# Patient Record
Sex: FEMALE | Race: OTHER | Employment: FULL TIME | URBAN - NONMETROPOLITAN AREA
[De-identification: names, ages, dates, MRNs, and addresses within clinical notes are randomized per-mention and may not be internally consistent; named-entity substitution may affect disease eponyms.]

---

## 2018-06-22 ENCOUNTER — HOSPITAL ENCOUNTER (OUTPATIENT)
Dept: WOMENS IMAGING | Age: 58
Discharge: HOME | End: 2018-06-22
Payer: COMMERCIAL

## 2018-06-22 DIAGNOSIS — Z13.9 VISIT FOR SCREENING: ICD-10-CM

## 2018-06-22 PROCEDURE — 77063 BREAST TOMOSYNTHESIS BI: CPT

## 2019-06-28 ENCOUNTER — HOSPITAL ENCOUNTER (OUTPATIENT)
Dept: WOMENS IMAGING | Age: 59
Discharge: HOME | End: 2019-06-28
Payer: COMMERCIAL

## 2019-06-28 DIAGNOSIS — Z13.9 VISIT FOR SCREENING: ICD-10-CM

## 2019-06-28 PROCEDURE — 77063 BREAST TOMOSYNTHESIS BI: CPT

## 2020-07-10 ENCOUNTER — HOSPITAL ENCOUNTER (OUTPATIENT)
Dept: WOMENS IMAGING | Age: 60
Discharge: HOME | End: 2020-07-10
Payer: COMMERCIAL

## 2020-07-10 PROCEDURE — 77063 BREAST TOMOSYNTHESIS BI: CPT

## 2020-11-18 ENCOUNTER — APPOINTMENT (OUTPATIENT)
Dept: CT IMAGING | Age: 60
DRG: 390 | End: 2020-11-18
Payer: COMMERCIAL

## 2020-11-18 ENCOUNTER — APPOINTMENT (OUTPATIENT)
Dept: GENERAL RADIOLOGY | Age: 60
DRG: 390 | End: 2020-11-18
Payer: COMMERCIAL

## 2020-11-18 ENCOUNTER — HOSPITAL ENCOUNTER (INPATIENT)
Age: 60
LOS: 4 days | Discharge: HOME | DRG: 390 | End: 2020-11-22
Attending: EMERGENCY MEDICINE | Admitting: SURGERY
Payer: COMMERCIAL

## 2020-11-18 PROBLEM — K56.609 SMALL BOWEL OBSTRUCTION (HCC): Status: ACTIVE | Noted: 2020-11-18

## 2020-11-18 LAB
ALBUMIN SERPL-MCNC: 4.5 G/DL (ref 3.5–5.1)
ALP BLD-CCNC: 54 U/L (ref 38–126)
ALT SERPL-CCNC: 22 U/L (ref 11–66)
ANION GAP SERPL CALCULATED.3IONS-SCNC: 12 MEQ/L (ref 8–16)
APTT: 30 SECONDS (ref 22–38)
AST SERPL-CCNC: 20 U/L (ref 5–40)
BASOPHILS # BLD: 0.3 %
BASOPHILS ABSOLUTE: 0 THOU/MM3 (ref 0–0.1)
BILIRUB SERPL-MCNC: 0.6 MG/DL (ref 0.3–1.2)
BUN BLDV-MCNC: 10 MG/DL (ref 7–22)
CALCIUM SERPL-MCNC: 9.7 MG/DL (ref 8.5–10.5)
CHLORIDE BLD-SCNC: 96 MEQ/L (ref 98–111)
CO2: 25 MEQ/L (ref 23–33)
CREAT SERPL-MCNC: 0.5 MG/DL (ref 0.4–1.2)
EKG ATRIAL RATE: 79 BPM
EKG P AXIS: 54 DEGREES
EKG P-R INTERVAL: 148 MS
EKG Q-T INTERVAL: 402 MS
EKG QRS DURATION: 80 MS
EKG QTC CALCULATION (BAZETT): 460 MS
EKG R AXIS: 13 DEGREES
EKG T AXIS: 59 DEGREES
EKG VENTRICULAR RATE: 79 BPM
EOSINOPHIL # BLD: 0.1 %
EOSINOPHILS ABSOLUTE: 0 THOU/MM3 (ref 0–0.4)
ERYTHROCYTE [DISTWIDTH] IN BLOOD BY AUTOMATED COUNT: 14.8 % (ref 11.5–14.5)
ERYTHROCYTE [DISTWIDTH] IN BLOOD BY AUTOMATED COUNT: 45.5 FL (ref 35–45)
GFR SERPL CREATININE-BSD FRML MDRD: > 90 ML/MIN/1.73M2
GLUCOSE BLD-MCNC: 133 MG/DL (ref 70–108)
GLUCOSE BLD-MCNC: 175 MG/DL (ref 70–108)
GLUCOSE BLD-MCNC: 79 MG/DL (ref 70–108)
GLUCOSE BLD-MCNC: 96 MG/DL (ref 70–108)
HCT VFR BLD CALC: 44.6 % (ref 37–47)
HEMOGLOBIN: 14.7 GM/DL (ref 12–16)
IMMATURE GRANS (ABS): 0.05 THOU/MM3 (ref 0–0.07)
IMMATURE GRANULOCYTES: 0.3 %
INR BLD: 0.97 (ref 0.85–1.13)
LIPASE: 42.3 U/L (ref 5.6–51.3)
LYMPHOCYTES # BLD: 11.7 %
LYMPHOCYTES ABSOLUTE: 1.7 THOU/MM3 (ref 1–4.8)
MCH RBC QN AUTO: 28.1 PG (ref 26–33)
MCHC RBC AUTO-ENTMCNC: 33 GM/DL (ref 32.2–35.5)
MCV RBC AUTO: 85.3 FL (ref 81–99)
MONOCYTES # BLD: 3.2 %
MONOCYTES ABSOLUTE: 0.5 THOU/MM3 (ref 0.4–1.3)
NUCLEATED RED BLOOD CELLS: 0 /100 WBC
OSMOLALITY CALCULATION: 269.7 MOSMOL/KG (ref 275–300)
PLATELET # BLD: 328 THOU/MM3 (ref 130–400)
PMV BLD AUTO: 10.3 FL (ref 9.4–12.4)
POTASSIUM SERPL-SCNC: 4.2 MEQ/L (ref 3.5–5.2)
RBC # BLD: 5.23 MILL/MM3 (ref 4.2–5.4)
SEG NEUTROPHILS: 84.4 %
SEGMENTED NEUTROPHILS ABSOLUTE COUNT: 12.4 THOU/MM3 (ref 1.8–7.7)
SODIUM BLD-SCNC: 133 MEQ/L (ref 135–145)
TOTAL PROTEIN: 7.6 G/DL (ref 6.1–8)
TROPONIN T: < 0.01 NG/ML
WBC # BLD: 14.7 THOU/MM3 (ref 4.8–10.8)

## 2020-11-18 PROCEDURE — 6360000004 HC RX CONTRAST MEDICATION: Performed by: EMERGENCY MEDICINE

## 2020-11-18 PROCEDURE — 2580000003 HC RX 258: Performed by: EMERGENCY MEDICINE

## 2020-11-18 PROCEDURE — 2500000003 HC RX 250 WO HCPCS: Performed by: PHYSICIAN ASSISTANT

## 2020-11-18 PROCEDURE — APPSS180 APP SPLIT SHARED TIME > 60 MINUTES: Performed by: PHYSICIAN ASSISTANT

## 2020-11-18 PROCEDURE — 85730 THROMBOPLASTIN TIME PARTIAL: CPT

## 2020-11-18 PROCEDURE — 85610 PROTHROMBIN TIME: CPT

## 2020-11-18 PROCEDURE — 6360000002 HC RX W HCPCS: Performed by: EMERGENCY MEDICINE

## 2020-11-18 PROCEDURE — 74018 RADEX ABDOMEN 1 VIEW: CPT

## 2020-11-18 PROCEDURE — 82948 REAGENT STRIP/BLOOD GLUCOSE: CPT

## 2020-11-18 PROCEDURE — 6360000002 HC RX W HCPCS: Performed by: PHYSICIAN ASSISTANT

## 2020-11-18 PROCEDURE — 74177 CT ABD & PELVIS W/CONTRAST: CPT

## 2020-11-18 PROCEDURE — 36415 COLL VENOUS BLD VENIPUNCTURE: CPT

## 2020-11-18 PROCEDURE — 2580000003 HC RX 258: Performed by: PHYSICIAN ASSISTANT

## 2020-11-18 PROCEDURE — 99285 EMERGENCY DEPT VISIT HI MDM: CPT

## 2020-11-18 PROCEDURE — 85025 COMPLETE CBC W/AUTO DIFF WBC: CPT

## 2020-11-18 PROCEDURE — 96374 THER/PROPH/DIAG INJ IV PUSH: CPT

## 2020-11-18 PROCEDURE — 80053 COMPREHEN METABOLIC PANEL: CPT

## 2020-11-18 PROCEDURE — 6360000002 HC RX W HCPCS

## 2020-11-18 PROCEDURE — 93005 ELECTROCARDIOGRAM TRACING: CPT | Performed by: EMERGENCY MEDICINE

## 2020-11-18 PROCEDURE — 84484 ASSAY OF TROPONIN QUANT: CPT

## 2020-11-18 PROCEDURE — 1200000000 HC SEMI PRIVATE

## 2020-11-18 PROCEDURE — 83690 ASSAY OF LIPASE: CPT

## 2020-11-18 PROCEDURE — 96375 TX/PRO/DX INJ NEW DRUG ADDON: CPT

## 2020-11-18 PROCEDURE — 99222 1ST HOSP IP/OBS MODERATE 55: CPT | Performed by: SURGERY

## 2020-11-18 RX ORDER — SODIUM CHLORIDE 0.9 % (FLUSH) 0.9 %
10 SYRINGE (ML) INJECTION PRN
Status: DISCONTINUED | OUTPATIENT
Start: 2020-11-18 | End: 2020-11-22 | Stop reason: HOSPADM

## 2020-11-18 RX ORDER — ONDANSETRON 4 MG/1
4 TABLET, ORALLY DISINTEGRATING ORAL EVERY 8 HOURS PRN
Status: DISCONTINUED | OUTPATIENT
Start: 2020-11-18 | End: 2020-11-22 | Stop reason: HOSPADM

## 2020-11-18 RX ORDER — DEXTROSE MONOHYDRATE 50 MG/ML
100 INJECTION, SOLUTION INTRAVENOUS PRN
Status: DISCONTINUED | OUTPATIENT
Start: 2020-11-18 | End: 2020-11-22 | Stop reason: HOSPADM

## 2020-11-18 RX ORDER — SODIUM CHLORIDE 9 MG/ML
INJECTION, SOLUTION INTRAVENOUS CONTINUOUS
Status: DISCONTINUED | OUTPATIENT
Start: 2020-11-18 | End: 2020-11-18

## 2020-11-18 RX ORDER — MORPHINE SULFATE 4 MG/ML
4 INJECTION, SOLUTION INTRAMUSCULAR; INTRAVENOUS
Status: DISCONTINUED | OUTPATIENT
Start: 2020-11-18 | End: 2020-11-22 | Stop reason: HOSPADM

## 2020-11-18 RX ORDER — SODIUM CHLORIDE 9 MG/ML
INJECTION, SOLUTION INTRAVENOUS CONTINUOUS
Status: DISCONTINUED | OUTPATIENT
Start: 2020-11-18 | End: 2020-11-22

## 2020-11-18 RX ORDER — SODIUM CHLORIDE 0.9 % (FLUSH) 0.9 %
10 SYRINGE (ML) INJECTION EVERY 12 HOURS SCHEDULED
Status: DISCONTINUED | OUTPATIENT
Start: 2020-11-18 | End: 2020-11-22 | Stop reason: HOSPADM

## 2020-11-18 RX ORDER — ONDANSETRON 2 MG/ML
4 INJECTION INTRAMUSCULAR; INTRAVENOUS ONCE
Status: COMPLETED | OUTPATIENT
Start: 2020-11-18 | End: 2020-11-18

## 2020-11-18 RX ORDER — MORPHINE SULFATE 2 MG/ML
INJECTION, SOLUTION INTRAMUSCULAR; INTRAVENOUS
Status: DISCONTINUED
Start: 2020-11-18 | End: 2020-11-18 | Stop reason: WASHOUT

## 2020-11-18 RX ORDER — 0.9 % SODIUM CHLORIDE 0.9 %
1000 INTRAVENOUS SOLUTION INTRAVENOUS ONCE
Status: COMPLETED | OUTPATIENT
Start: 2020-11-18 | End: 2020-11-18

## 2020-11-18 RX ORDER — MORPHINE SULFATE 4 MG/ML
4 INJECTION, SOLUTION INTRAMUSCULAR; INTRAVENOUS ONCE
Status: COMPLETED | OUTPATIENT
Start: 2020-11-18 | End: 2020-11-18

## 2020-11-18 RX ORDER — MORPHINE SULFATE 2 MG/ML
2 INJECTION, SOLUTION INTRAMUSCULAR; INTRAVENOUS
Status: DISCONTINUED | OUTPATIENT
Start: 2020-11-18 | End: 2020-11-22 | Stop reason: HOSPADM

## 2020-11-18 RX ORDER — ONDANSETRON 2 MG/ML
4 INJECTION INTRAMUSCULAR; INTRAVENOUS EVERY 6 HOURS PRN
Status: DISCONTINUED | OUTPATIENT
Start: 2020-11-18 | End: 2020-11-22 | Stop reason: HOSPADM

## 2020-11-18 RX ORDER — DEXTROSE MONOHYDRATE 25 G/50ML
12.5 INJECTION, SOLUTION INTRAVENOUS PRN
Status: DISCONTINUED | OUTPATIENT
Start: 2020-11-18 | End: 2020-11-22 | Stop reason: HOSPADM

## 2020-11-18 RX ORDER — ONDANSETRON 2 MG/ML
INJECTION INTRAMUSCULAR; INTRAVENOUS
Status: DISPENSED
Start: 2020-11-18 | End: 2020-11-18

## 2020-11-18 RX ORDER — NICOTINE POLACRILEX 4 MG
15 LOZENGE BUCCAL PRN
Status: DISCONTINUED | OUTPATIENT
Start: 2020-11-18 | End: 2020-11-22 | Stop reason: HOSPADM

## 2020-11-18 RX ADMIN — SODIUM CHLORIDE: 9 INJECTION, SOLUTION INTRAVENOUS at 22:44

## 2020-11-18 RX ADMIN — FAMOTIDINE 20 MG: 10 INJECTION INTRAVENOUS at 20:24

## 2020-11-18 RX ADMIN — ONDANSETRON 4 MG: 2 INJECTION INTRAMUSCULAR; INTRAVENOUS at 04:24

## 2020-11-18 RX ADMIN — MORPHINE SULFATE 4 MG: 4 INJECTION, SOLUTION INTRAMUSCULAR; INTRAVENOUS at 04:26

## 2020-11-18 RX ADMIN — ONDANSETRON 4 MG: 2 INJECTION INTRAMUSCULAR; INTRAVENOUS at 09:10

## 2020-11-18 RX ADMIN — Medication 10 ML: at 09:10

## 2020-11-18 RX ADMIN — MORPHINE SULFATE 2 MG: 2 INJECTION, SOLUTION INTRAMUSCULAR; INTRAVENOUS at 09:12

## 2020-11-18 RX ADMIN — FAMOTIDINE 20 MG: 10 INJECTION INTRAVENOUS at 09:10

## 2020-11-18 RX ADMIN — IOPAMIDOL 80 ML: 755 INJECTION, SOLUTION INTRAVENOUS at 04:35

## 2020-11-18 RX ADMIN — SODIUM CHLORIDE 1000 ML: 9 INJECTION, SOLUTION INTRAVENOUS at 04:23

## 2020-11-18 RX ADMIN — SODIUM CHLORIDE: 9 INJECTION, SOLUTION INTRAVENOUS at 06:44

## 2020-11-18 RX ADMIN — SODIUM CHLORIDE: 9 INJECTION, SOLUTION INTRAVENOUS at 14:28

## 2020-11-18 ASSESSMENT — ENCOUNTER SYMPTOMS
NAUSEA: 0
CHEST TIGHTNESS: 0
EYE PAIN: 0
EYE ITCHING: 0
STRIDOR: 0
EYE DISCHARGE: 0
ABDOMINAL DISTENTION: 0
SHORTNESS OF BREATH: 0
COUGH: 0
EYE REDNESS: 0
SORE THROAT: 0
VOMITING: 0
BACK PAIN: 0
WHEEZING: 0
PHOTOPHOBIA: 0
DIARRHEA: 0
RHINORRHEA: 0
ABDOMINAL PAIN: 1
CONSTIPATION: 0

## 2020-11-18 ASSESSMENT — PAIN DESCRIPTION - PROGRESSION

## 2020-11-18 ASSESSMENT — PAIN SCALES - GENERAL
PAINLEVEL_OUTOF10: 3
PAINLEVEL_OUTOF10: 6
PAINLEVEL_OUTOF10: 8
PAINLEVEL_OUTOF10: 8
PAINLEVEL_OUTOF10: 4
PAINLEVEL_OUTOF10: 2
PAINLEVEL_OUTOF10: 6

## 2020-11-18 ASSESSMENT — PAIN DESCRIPTION - PAIN TYPE
TYPE: ACUTE PAIN
TYPE: ACUTE PAIN

## 2020-11-18 ASSESSMENT — PAIN DESCRIPTION - FREQUENCY: FREQUENCY: CONTINUOUS

## 2020-11-18 ASSESSMENT — PAIN DESCRIPTION - LOCATION
LOCATION: ABDOMEN
LOCATION: ABDOMEN

## 2020-11-18 ASSESSMENT — PAIN DESCRIPTION - ORIENTATION
ORIENTATION: MID;LEFT;RIGHT
ORIENTATION: MID;RIGHT;LEFT

## 2020-11-18 ASSESSMENT — PAIN - FUNCTIONAL ASSESSMENT: PAIN_FUNCTIONAL_ASSESSMENT: ACTIVITIES ARE NOT PREVENTED

## 2020-11-18 ASSESSMENT — PAIN DESCRIPTION - ONSET: ONSET: ON-GOING

## 2020-11-18 NOTE — H&P
[address] [facility] History & Physical - Karen Varghese PA-C  On behalf of Dr. Gaurav Meredith    Pt Name: Km Stewart  MRN: [id number]  YOB: 1960  Date of evaluation: 11/18/2020  Primary Care Physician: No primary care provider on file. Patient evaluated at the request of  Dr. Patricia Rolle  Reason for evaluation: SBO  IMPRESSIONS   1. Small bowel obstruction  2. Abdominal pain  3. Nausea/vomiting  4.  has a past medical history of Type II or unspecified type diabetes mellitus without mention of complication, not stated as uncontrolled. PLANS   1. NPO  2. IV fluids  3. NG tube LIWS  4. Pain control  5. Antiemetics  6. Repeat labs in morning  7. Sliding scale insulin with accuchecks  8. Attempt conservative management at this time, Dr. Gaurav Meredith to follow up later this morning for further recommendations. SUBJECTIVE   History of Chief Complaint:    Marvin Hutchinson is a 64 y. o.female who presents with abdominal pain. Patient reported she developed acute diffuse abdominal pain yesterday morning that progressed throughout the day. Patient described the pain as \"cramping\". Patient also endorsed multiple episodes of nausea with vomiting. Patient stated he has a past surgical history of exploratory laparotomy for a bowel injury during a gynecological procedure that resulted in a colostomy. Patient endorsed she later had colostomy reversal and abdominal hernia surgery. Patient stated her last bowel movement was yesterday afternoon. She denied any other pain or complaints. She denied any fever/chills, headaches, lightheadedness, dizziness, chest pain, shortness of breath, dysuria, and paresthesias. On exam patient's abdomen was soft with diffuse tenderness to deep palpation. No guarding or rebound tenderness noted. NG tube in place.  CT abdomen/pelvis in ED showed moderate small bowel obstruction with transition point near the surgical anastomosis of the right lower quadrant, possibly due to adhesions and mild diffuse colonic wall thickening which could be due to colitis. Plan for patient to be admitted under general surgery to Dr. Ivone Brewer per patient request. Patient to be NPO, IVF, NG tube, pain control, and antiemetics. Case discussed with general surgeon on call, Dr. Claudetta Grant, and general surgeon, Dr Ivone Brewer. Past Medical History   has a past medical history of Type II or unspecified type diabetes mellitus without mention of complication, not stated as uncontrolled. Past Surgical History   has a past surgical history that includes Abdomen surgery (2005). Medications  Prior to Admission medications    Medication Sig Start Date End Date Taking? Authorizing Provider   metFORMIN (GLUCOPHAGE) 1000 MG tablet TAKE 1 TABLET BY MOUTH TWO TIMES A DAY  6/22/17   Jmoar Gooden MD   glimepiride (AMARYL) 2 MG tablet Take 1/2 tab BID 6/15/16   Jomar Gooden MD   sitaGLIPtin (JANUVIA) 100 MG tablet Take 1 tablet by mouth daily 6/15/16   Jomar Gooden MD   estradiol (ESTRACE) 1 MG tablet Take 1 mg by mouth daily. Historical Provider, MD   Calcium-Vitamin D (CALCIUM + D PO) Take 1 tablet by mouth daily. Historical Provider, MD   Multiple Vitamin (MULTI-VITAMIN PO) Take 1 tablet by mouth daily. Historical Provider, MD    Scheduled Meds:  Continuous Infusions:   sodium chloride       PRN Meds:. Allergies  has No Known Allergies. Family History  family history is not on file. Social History   reports that she has never smoked. She has never used smokeless tobacco. She reports that she does not drink alcohol or use drugs. Review of Systems:  General Denies any fever or chills  HEENT Denies any headaches, lightheadedness, dizziness  Resp Denies any shortness of breath, cough or wheezing  Cardiac Denies any chest pain or palpitations  GI Admits abdominal pain, nausea, and vomiting.  Denies constipation   Denies any dysuria, frequency, or urinary compliants  Heme Denies bruising or bleeding easily  Endocrine Admits history of BUN 10   CREATININE 0.5   CALCIUM 9.7   INR 0.97   AST 20   ALT 22   BILITOT 0.6   LIPASE 42.3     RADIOLOGY     CT ABDOMEN PELVIS W IV CONTRAST Additional Contrast? None   Final Result   Moderate small bowel obstruction with transition point near a surgical    anastomosis in the right lower quadrant, possibly due to adhesions. Mild diffuse colonic wall thickening, which could be due to colitis. Nonemergent/incidental findings in the report. This document has been electronically signed by: Keshav Walters MD on    11/18/2020 05:06 AM      All CT scans at this facility use dose modulation, iterative    reconstruction, and/or weight-based   dosing when appropriate to reduce radiation dose to as low as reasonably    achievable. XR ABDOMEN FOR NG/OG/NE TUBE PLACEMENT    (Results Pending)       Thank you for the interesting evaluation. Further recommendations to follow. Electronically signed by Taniya Sotelo PA-C on 11/18/2020 at 6:31 AM    Patient seen and examined independently by me. The above note has been modified by me to reflect current findings and plans. Labs, cultures, and radiographs where available were reviewed. I discussed patient concerns with the patient's nurse and instructions were given. Please see our orders for the updated patient care plan. SBO either due to adhesions or anastomotic stricture. No emergent surgical indications. NG, follow clinically with labs, films and exams.       Electronically signed by Chapincito Isabel DO on 11/18/2020 at 3:19 PM

## 2020-11-18 NOTE — ED NOTES
Patient resting quietly in cot showing no signs of distress at this time. Rates pain 4/10 but denies need for pain or nausea medication at this time. Updated on POC. Will continue to monitor.       Ludwin Ryan[facility]  11/18/20 8835

## 2020-11-18 NOTE — ED NOTES
ED to inpatient nurses report    Chief Complaint   Patient presents with    Abdominal Pain    Nausea    Emesis      Present to ED from home  LOC: alert and orientated to name, place, date  Vital signs   Vitals:    11/18/20 0327 11/18/20 0428 11/18/20 0612 11/18/20 0631   BP: 136/67 132/76 117/75 (!) 145/93   Pulse: 76 71 71 86   Resp: 18 16 16 20   Temp: 98.2 °F (36.8 °C)      TempSrc: Oral      SpO2: 98% 98% 100% 97%   Weight: 125 lb (56.7 kg)      Height: 4' 11\" (1.499 m)         Oxygen Baseline RA    Current needs required Monitor NG tube output as well as IV fluids infusing. Monitor pt pain. No other needs. Bipap/Cpap No  LDAs:   Peripheral IV 11/18/20 Right Antecubital (Active)   Site Assessment Clean; Intact;Dry 11/18/20 0351   Line Status Brisk blood return;Normal saline locked; Flushed;Specimen collected 11/18/20 0351   Dressing Status Clean;Dry; Intact 11/18/20 0351   Dressing Intervention New 11/18/20 0351     Mobility: Independent  Pending ED orders: None. Present condition: pt resting in cot. Pt pain under control and pt family aware of POC. Pt uncomfortable with NG, but understands need for placement. Pt is NPO. Plan to undergo surgery with Dr. Long Shi per her request. Pt VSS and pt denies needs at this time.    Person of contract , phone number [phone]  Our promise was given to patient    Electronically signed by Merlinda Marchi, RN on 11/18/2020 at [address] A[address]  Dillon Garcia RN  11/18/20 5452

## 2020-11-18 NOTE — ED NOTES
This RN talked with Dr. Naila Rodney in regards to pt tube placement. Physician reviewed xray and informed RN that tube is appropriately placed. Will continue to monitor pt.       Emeterio Morgan RN  11/18/20 0700

## 2020-11-18 NOTE — ED TRIAGE NOTES
Pt presents to the ED with c/o abdominal distention, cramping pain, nausea, and emesis. Pt states that this pain began at 1000 yesterday morning. Pt holding her abdomen and appears to be in pain. Pt reports having a BM yesterday. Pt has distention in her abdomen. Pt breathing easy and unlabored, but in pain with groans. Pt alert and oriented. Pt made comfortable in cot. EKG completed and given to provider. VS obtained. IV inserted and labs obtained and sent to lab. Dr. Mirella Brambila at bedside with pt.

## 2020-11-18 NOTE — ED PROVIDER NOTES
[address] ENCOUNTER      PATIENT NAME: Joss Tejada  MRN: [id number]  : 1960  STODDARD: 2020  PROVIDER: Reva Loomis MD      CHIEF COMPLAINT       Chief Complaint   Patient presents with    Abdominal Pain    Nausea    Emesis       Nurses Notes reviewed and I agreeexcept as noted in the HPI. HISTORY OF PRESENT ILLNESS    Joss Tejada is a 64 y.o. female who presents to Emergency Department with abdominal pain      Onset: Gradual  Location: At home  Severity: Moderate  Timin hours ago  Modifying factors: None  Quality: increasing abdominal pain  Radiation: None  Duration: Persistent  Context: Abdominal pain is gradual onset. Last bowel movement was yesterday afternoon. Pain is moderate now with moderate nausea. Prior abdominal surgeries include exploratory laparotomy for bowel injury during gynecological procedure, patient had colostomy and later colostomy reversal by Dr. Ernesto Ashford. No history of SBO. Prior episodes: None  Therapy today: None  Associated Symptoms: Nausea. Additional history: Hx of type II DM. This HPI was provided by the patient. REVIEW OF SYSTEMS     Review of Systems   Constitutional: Negative for activity change, appetite change, chills, fatigue, fever and unexpected weight change. HENT: Negative for congestion, ear discharge, ear pain, hearing loss, nosebleeds, rhinorrhea and sore throat. Eyes: Negative for photophobia, pain, discharge, redness and itching. Respiratory: Negative for cough, chest tightness, shortness of breath, wheezing and stridor. Cardiovascular: Negative for chest pain, palpitations and leg swelling. Gastrointestinal: Positive for abdominal pain. Negative for abdominal distention, constipation, diarrhea, nausea and vomiting. Endocrine: Negative for cold intolerance, heat intolerance, polydipsia and polyphagia. Genitourinary: Negative for dysuria, flank pain, frequency and hematuria. Musculoskeletal: Negative for arthralgias, back pain, gait problem, myalgias, neck pain and neck stiffness. Skin: Negative for pallor, rash and wound. Allergic/Immunologic: Negative for environmental allergies and food allergies. Neurological: Negative for dizziness, tremors, syncope, weakness and headaches. Psychiatric/Behavioral: Negative for agitation, behavioral problems, confusion, self-injury, sleep disturbance and suicidal ideas. PAST MEDICAL HISTORY     Past Medical History:   Diagnosis Date    Type II or unspecified type diabetes mellitus without mention of complication, not stated as uncontrolled     controlled       SURGICAL HISTORY       Past Surgical History:   Procedure Laterality Date   [place]  2005    Dr. Alvarez Weinberg at [facility] sx X2       CURRENT MEDICATIONS       Previous Medications    CALCIUM-VITAMIN D (CALCIUM + D PO)    Take 1 tablet by mouth daily. ESTRADIOL (ESTRACE) 1 MG TABLET    Take 1 mg by mouth daily. GLIMEPIRIDE (AMARYL) 2 MG TABLET    Take 1/2 tab BID    METFORMIN (GLUCOPHAGE) 1000 MG TABLET    TAKE 1 TABLET BY MOUTH TWO TIMES A DAY     MULTIPLE VITAMIN (MULTI-VITAMIN PO)    Take 1 tablet by mouth daily. SITAGLIPTIN (JANUVIA) 100 MG TABLET    Take 1 tablet by mouth daily       ALLERGIES     Patient has no known allergies. FAMILY HISTORY     has no family status information on file. family history is not on file. SOCIAL HISTORY      reports that she has never smoked. She has never used smokeless tobacco. She reports that she does not drink alcohol or use drugs. PHYSICAL EXAM     INITIAL VITALS:  height is 4' 11\" (1.499 m) and weight is 125 lb (56.7 kg). Her oral temperature is 98.2 °F (36.8 °C). Her blood pressure is 132/76 and her pulse is 71. Her respiration is 16 and oxygen saturation is 98%. Physical Exam  Vitals signs and nursing note reviewed. Constitutional:       Appearance: She is well-developed. She is not diaphoretic. HENT:      Head: Normocephalic and atraumatic. Nose: Nose normal.   Eyes:      General: No scleral icterus. Right eye: No discharge. Left eye: No discharge. Conjunctiva/sclera: Conjunctivae normal.      Pupils: Pupils are equal, round, and reactive to light. Neck:      Musculoskeletal: Normal range of motion and neck supple. Vascular: No JVD. Trachea: No tracheal deviation. Cardiovascular:      Rate and Rhythm: Normal rate and regular rhythm. Heart sounds: Normal heart sounds. No murmur. No friction rub. No gallop. Pulmonary:      Effort: Pulmonary effort is normal. No respiratory distress. Breath sounds: Normal breath sounds. No stridor. No wheezing or rales. Chest:      Chest wall: No tenderness. Abdominal:      General: Bowel sounds are decreased. There is distension. Palpations: Abdomen is soft. There is no mass. Tenderness: There is abdominal tenderness in the periumbilical area. There is no guarding or rebound. Negative signs include Strange's sign, Rovsing's sign, McBurney's sign, psoas sign and obturator sign. Hernia: No hernia is present. Musculoskeletal:         General: No tenderness or deformity. Lymphadenopathy:      Cervical: No cervical adenopathy. Skin:     General: Skin is warm and dry. Capillary Refill: Capillary refill takes less than 2 seconds. Coloration: Skin is not pale. Findings: No erythema or rash. Neurological:      Mental Status: She is alert and oriented to person, place, and time. Cranial Nerves: No cranial nerve deficit. Sensory: No sensory deficit. Motor: No abnormal muscle tone. Coordination: Coordination normal.      Deep Tendon Reflexes: Reflexes normal.   Psychiatric:         Behavior: Behavior normal.         Thought Content:  Thought content normal.         Judgment: Judgment normal.         DIFFERENTIAL DIAGNOSIS:   SBO, ileus, colitis, pancreatitis, gastritis, AMI, thou/mm3    Basophils Absolute 0.0 0.0 - 0.1 thou/mm3    Immature Grans (Abs) 0.05 0.00 - 0.07 thou/mm3    nRBC 0 /100 wbc   Comprehensive Metabolic Panel   Result Value Ref Range    Glucose 175 (H) 70 - 108 mg/dL    CREATININE 0.5 0.4 - 1.2 mg/dL    BUN 10 7 - 22 mg/dL    Sodium 133 (L) 135 - 145 meq/L    Potassium 4.2 3.5 - 5.2 meq/L    Chloride 96 (L) 98 - 111 meq/L    CO2 25 23 - 33 meq/L    Calcium 9.7 8.5 - 10.5 mg/dL    AST 20 5 - 40 U/L    Alkaline Phosphatase 54 38 - 126 U/L    Total Protein 7.6 6.1 - 8.0 g/dL    Alb 4.5 3.5 - 5.1 g/dL    Total Bilirubin 0.6 0.3 - 1.2 mg/dL    ALT 22 11 - 66 U/L   Lipase   Result Value Ref Range    Lipase 42.3 5.6 - 51.3 U/L   APTT   Result Value Ref Range    aPTT 30.0 22.0 - 38.0 seconds   Protime-INR   Result Value Ref Range    INR 0.97 0.85 - 1.13   Troponin   Result Value Ref Range    Troponin T < 0.010 ng/ml   Anion Gap   Result Value Ref Range    Anion Gap 12.0 8.0 - 16.0 meq/L   Glomerular Filtration Rate, Estimated   Result Value Ref Range    Est, Glom Filt Rate >90 ml/min/1.73m2   Osmolality   Result Value Ref Range    Osmolality Calc 269.7 (L) 275.0 - 300.0 mOsmol/kg       EMERGENCY DEPARTMENT COURSE:   Vitals:    Vitals:    11/18/20 0327 11/18/20 0428   BP: 136/67 132/76   Pulse: 76 71   Resp: 18 16   Temp: 98.2 °F (36.8 °C)    TempSrc: Oral    SpO2: 98% 98%   Weight: 125 lb (56.7 kg)    Height: 4' 11\" (1.499 m)      3:46 AM: Patient is seen and evaluated in a timely fashion.      ACTIONS:  Large bore IV  Tele monitor  CT ABDOMEN PELVIS W IV CONTRAST  Labs Reviewed   CBC WITH AUTO DIFFERENTIAL   COMPREHENSIVE METABOLIC PANEL   LIPASE   URINE RT REFLEX TO CULTURE   APTT   PROTIME-INR   TROPONIN     Medications   morphine injection 4 mg (has no administration in time range)   ondansetron (ZOFRAN) injection 4 mg (has no administration in time range)   0.9 % sodium chloride bolus (has no administration in time range)       MEDICAL DECISION MAKINGS:    History and physical exam suspect this is SBO. SBO is confirmed by CT abdomen pelvis, transition at right lower quadrant previous anastomosis site. No surgical abdomen. Pain is better on IV morphine. Treatment in ED includes: NPO, morphine for pain control, IV fluid, and NG tube    Discussed and admitted to general surgery service. CRITICAL CARE:   None    CONSULTS:  Dr. Kush Varela and Henrique Logan PA-C on call for GS    PROCEDURES:  None    FINAL IMPRESSION      1. SBO (small bowel obstruction) ([facility])          DISPOSITION/PLAN   Admit    PATIENT REFERRED TO:  No follow-up provider specified.     DISCHARGE MEDICATIONS:  New Prescriptions    No medications on file       (Please note that portions of this note were completed with a voice recognition program.  Efforts were made to edit the dictations but occasionally words aremis-transcribed.)    MD Pedro Fraga MD  11/18/20 9300

## 2020-11-18 NOTE — ED NOTES
ED nurse-to-nurse bedside report    Chief Complaint   Patient presents with    Abdominal Pain    Nausea    Emesis      LOC: alert and orientated to name, place, date  Vital signs   Vitals:    11/18/20 0428 11/18/20 0612 11/18/20 0631 11/18/20 0654   BP: 132/76 117/75 (!) 145/93 (!) 144/87   Pulse: 71 71 86 87   Resp: 16 16 20 15   Temp:       TempSrc:       SpO2: 98% 100% 97% 98%   Weight:       Height:          Pain:    Pain Interventions: morphine, zofran  Pain Goal: 4/10  Oxygen: No    Current needs required none at this time   Telemetry: Yes  LDAs:   Peripheral IV 11/18/20 Right Antecubital (Active)   Site Assessment Clean; Intact;Dry 11/18/20 0351   Line Status Brisk blood return;Normal saline locked; Flushed;Specimen collected 11/18/20 0351   Dressing Status Clean;Dry; Intact 11/18/20 0351   Dressing Intervention New 11/18/20 0351     Continuous Infusions:    sodium chloride 125 mL/hr at 11/18/20 1016     Mobility: Independent  Tee Fall Risk Score: No flowsheet data found. Fall Interventions: call light in reach. Side rails up x2, brakes on. Pt low fall risk.    Report given to: Brenda Vega RN  11/18/20 [address], RN  11/18/20 3236

## 2020-11-18 NOTE — ED NOTES
NG tube placed by this RN. Pt tolerated insertion well, but reporting feeling it in her throat and being uncomfortable. This RN informed Dr. Rin Peters and he is to discuss tube and verify placement via xray. NG inserted and placed on low intermittent suction. 53cm in right nostril. Will continue to monitor pt. VS updated.       Shailesh Mcleod RN  11/18/20 8907

## 2020-11-19 ENCOUNTER — APPOINTMENT (OUTPATIENT)
Dept: GENERAL RADIOLOGY | Age: 60
DRG: 390 | End: 2020-11-19
Payer: COMMERCIAL

## 2020-11-19 LAB
ANION GAP SERPL CALCULATED.3IONS-SCNC: 12 MEQ/L (ref 8–16)
BUN BLDV-MCNC: 9 MG/DL (ref 7–22)
CALCIUM SERPL-MCNC: 7.8 MG/DL (ref 8.5–10.5)
CHLORIDE BLD-SCNC: 104 MEQ/L (ref 98–111)
CO2: 26 MEQ/L (ref 23–33)
CREAT SERPL-MCNC: 0.3 MG/DL (ref 0.4–1.2)
ERYTHROCYTE [DISTWIDTH] IN BLOOD BY AUTOMATED COUNT: 15.1 % (ref 11.5–14.5)
ERYTHROCYTE [DISTWIDTH] IN BLOOD BY AUTOMATED COUNT: 47.8 FL (ref 35–45)
GFR SERPL CREATININE-BSD FRML MDRD: > 90 ML/MIN/1.73M2
GLUCOSE BLD-MCNC: 108 MG/DL (ref 70–108)
GLUCOSE BLD-MCNC: 109 MG/DL (ref 70–108)
GLUCOSE BLD-MCNC: 140 MG/DL (ref 70–108)
GLUCOSE BLD-MCNC: 86 MG/DL (ref 70–108)
GLUCOSE BLD-MCNC: 97 MG/DL (ref 70–108)
HCT VFR BLD CALC: 39.6 % (ref 37–47)
HEMOGLOBIN: 12.6 GM/DL (ref 12–16)
MCH RBC QN AUTO: 27.8 PG (ref 26–33)
MCHC RBC AUTO-ENTMCNC: 31.8 GM/DL (ref 32.2–35.5)
MCV RBC AUTO: 87.4 FL (ref 81–99)
PLATELET # BLD: 264 THOU/MM3 (ref 130–400)
PMV BLD AUTO: 10.2 FL (ref 9.4–12.4)
POTASSIUM REFLEX MAGNESIUM: 3.7 MEQ/L (ref 3.5–5.2)
RBC # BLD: 4.53 MILL/MM3 (ref 4.2–5.4)
SODIUM BLD-SCNC: 142 MEQ/L (ref 135–145)
WBC # BLD: 10.4 THOU/MM3 (ref 4.8–10.8)

## 2020-11-19 PROCEDURE — 99232 SBSQ HOSP IP/OBS MODERATE 35: CPT | Performed by: SURGERY

## 2020-11-19 PROCEDURE — 1200000000 HC SEMI PRIVATE

## 2020-11-19 PROCEDURE — 80048 BASIC METABOLIC PNL TOTAL CA: CPT

## 2020-11-19 PROCEDURE — 2500000003 HC RX 250 WO HCPCS: Performed by: PHYSICIAN ASSISTANT

## 2020-11-19 PROCEDURE — 85027 COMPLETE CBC AUTOMATED: CPT

## 2020-11-19 PROCEDURE — 6360000002 HC RX W HCPCS: Performed by: PHYSICIAN ASSISTANT

## 2020-11-19 PROCEDURE — 82948 REAGENT STRIP/BLOOD GLUCOSE: CPT

## 2020-11-19 PROCEDURE — 2580000003 HC RX 258: Performed by: PHYSICIAN ASSISTANT

## 2020-11-19 PROCEDURE — 36415 COLL VENOUS BLD VENIPUNCTURE: CPT

## 2020-11-19 PROCEDURE — 74018 RADEX ABDOMEN 1 VIEW: CPT

## 2020-11-19 RX ADMIN — FAMOTIDINE 20 MG: 10 INJECTION INTRAVENOUS at 08:39

## 2020-11-19 RX ADMIN — FAMOTIDINE 20 MG: 10 INJECTION INTRAVENOUS at 20:18

## 2020-11-19 RX ADMIN — SODIUM CHLORIDE: 9 INJECTION, SOLUTION INTRAVENOUS at 22:23

## 2020-11-19 RX ADMIN — MORPHINE SULFATE 2 MG: 2 INJECTION, SOLUTION INTRAMUSCULAR; INTRAVENOUS at 08:47

## 2020-11-19 RX ADMIN — MORPHINE SULFATE 2 MG: 2 INJECTION, SOLUTION INTRAMUSCULAR; INTRAVENOUS at 03:23

## 2020-11-19 RX ADMIN — SODIUM CHLORIDE: 9 INJECTION, SOLUTION INTRAVENOUS at 06:20

## 2020-11-19 RX ADMIN — ONDANSETRON 4 MG: 2 INJECTION INTRAMUSCULAR; INTRAVENOUS at 08:38

## 2020-11-19 ASSESSMENT — PAIN DESCRIPTION - PAIN TYPE
TYPE: ACUTE PAIN

## 2020-11-19 ASSESSMENT — PAIN DESCRIPTION - PROGRESSION: CLINICAL_PROGRESSION: NOT CHANGED

## 2020-11-19 ASSESSMENT — PAIN SCALES - GENERAL
PAINLEVEL_OUTOF10: 4
PAINLEVEL_OUTOF10: 2
PAINLEVEL_OUTOF10: 6
PAINLEVEL_OUTOF10: 2
PAINLEVEL_OUTOF10: 3
PAINLEVEL_OUTOF10: 2
PAINLEVEL_OUTOF10: 2
PAINLEVEL_OUTOF10: 4
PAINLEVEL_OUTOF10: 6
PAINLEVEL_OUTOF10: 2
PAINLEVEL_OUTOF10: 0

## 2020-11-19 ASSESSMENT — PAIN DESCRIPTION - LOCATION
LOCATION: ABDOMEN

## 2020-11-19 ASSESSMENT — PAIN DESCRIPTION - ORIENTATION
ORIENTATION: RIGHT;LEFT;MID
ORIENTATION: RIGHT;LEFT;LOWER;UPPER
ORIENTATION: RIGHT;LEFT;MID

## 2020-11-19 NOTE — PROGRESS NOTES
Carter Estevez, DO [facility]   General Surgery Daily Progress Note    Pt Name: Jose Iglesias  Medical Record Number: [id number]  Date of Birth 1960   Today's Date: 2020  Chief complaint: feeling better  ASSESSMENT   1. Hospital day # 1   2. SBO either due to adhesions or anastomotic stricture   has a past medical history of Type II or unspecified type diabetes mellitus without mention of complication, not stated as uncontrolled. PLAN   1. IV hydration  2. Analgesics and antiemetics as needed  3. NG to LIS  4. AM labs and films  5. Consideration for gastrografin study if fails to improve. 6. Lovenox for DVT prophylaxis  7. Increase activity  Tiffanie Toth is doing slightly better. She denies any nausea or vomiting, has not passed flatus or had a bowel movement. She is tolerating a Diet NPO Effective Now Exceptions are: Ice Chips, Sips with Meds. Her pain is well controlled on current medications. She has been ambulating in the room. CURRENT MEDICATIONS   Scheduled Meds:   [START ON 2020] enoxaparin  40 mg Subcutaneous Daily    sodium chloride flush  10 mL Intravenous 2 times per day    famotidine (PEPCID) injection  20 mg Intravenous BID    insulin lispro  0-6 Units Subcutaneous TID WC    insulin lispro  0-3 Units Subcutaneous Nightly     Continuous Infusions:   sodium chloride 125 mL/hr at 20 0620    dextrose       PRN Meds:.sodium chloride flush, ondansetron **OR** ondansetron, morphine **OR** morphine, glucose, dextrose, dextrose  OBJECTIVE   CURRENT VITALS:  height is 4' 11\" (1.499 m) and weight is 125 lb (56.7 kg). Her oral temperature is 98.6 °F (37 °C). Her blood pressure is 149/62 (abnormal) and her pulse is 74. Her respiration is 16 and oxygen saturation is 99%.    Temperature Range (24h):Temp: 98.6 °F (37 °C) Temp  Av.4 °F (36.9 °C)  Min: 98 °F (36.7 °C)  Max: 98.6 °F (37 °C)  BP Range (62N): Systolic (32TGB), TONY:628 , Min:128 , Max:149 Diastolic (84AAY), WKZ:74, Min:56, Max:67    Pulse Range (24h): Pulse  Av  Min: 68  Max: 74  Respiration Range (24h): Resp  Av.7  Min: 16  Max: 18  Current Pulse Ox (24h):  SpO2: 99 %  Pulse Ox Range (24h):  SpO2  Av.7 %  Min: 97 %  Max: 100 %  Oxygen Amount and Delivery:    Incentive Spirometry Tx:            GENERAL: alert, no distress  LUNGS: clear to ausculation, without wheezes, rales or rhonci  HEART: normal rate and regular rhythm  ABDOMEN: soft, non-tender, non-distended, bowel sounds present but decreased in all 4 quadrants and no guarding or peritoneal signs  EXTREMITY: no cyanosis, clubbing or edema  In: 2162.9 [I.V.:2162.9]  Out: 1325 [Urine:950]  Date 20 0000 - 20 2359   Shift 2887-4647 5462-3985 7558-7645 24 Hour Total   INTAKE   I.V.(mL/kg) 833(14.7) 1329. 9(23.5)  2162. 9(38.1)   Shift Total(mL/kg) 833(14.7) 1329. 9(23.5)  2162. 9(38.1)   OUTPUT   Urine(mL/kg/hr) 300(0.7) 650(1.4)  950   Emesis/NG output(mL/kg) 125(2.2) 250(4.4)  375(6.6)   Shift Total(mL/kg) 425(7.5) 900(15.9)  1325(23.4)   Weight (kg) 56.7 56.7 56.7 56.7     LABS     Recent Labs     20  0350 20  0606   WBC 14.7* 10.4   HGB 14.7 12.6   HCT 44.6 39.6    264   * 142   K 4.2 3.7   CL 96* 104   CO2 25 26   BUN 10 9   CREATININE 0.5 0.3*   CALCIUM 9.7 7.8*      Recent Labs     20  0350   INR 0.97     Recent Labs     20  0350   AST 20   ALT 22   BILITOT 0.6   LIPASE 42.3     Recent Labs     20  0350   TROPONINT < 0.010     RADIOLOGY     XR ABDOMEN (KUB) (SINGLE AP VIEW)   Final Result   Residual partial small bowel obstruction with mottled intraluminal    contents in the right lateral abdomen as previously seen within the    comparison CT 20. This document has been electronically signed by: Alaina Hay on    2020 08:27 AM         XR ABDOMEN FOR NG/OG/NE TUBE PLACEMENT   Final Result   Acceptable nasogastric tube placement.       This document has been electronically signed by: Bryan Herrera. Sasha Morales on    11/18/2020 07:31 AM         CT ABDOMEN PELVIS W IV CONTRAST Additional Contrast? None   Final Result   Moderate small bowel obstruction with transition point near a surgical    anastomosis in the right lower quadrant, possibly due to adhesions. Mild diffuse colonic wall thickening, which could be due to colitis. Nonemergent/incidental findings in the report. This document has been electronically signed by: Johana Franklin MD on    11/18/2020 05:06 AM      All CT scans at this facility use dose modulation, iterative    reconstruction, and/or weight-based   dosing when appropriate to reduce radiation dose to as low as reasonably    achievable.          XR ABDOMEN (KUB) (SINGLE AP VIEW)    (Results Pending)         Electronically signed by Elvie Ordoñez DO on 11/19/2020 at 4:00 PM

## 2020-11-19 NOTE — PROGRESS NOTES
[address] [place] [address]   Daily Progress Note     **This is a Medical/ PA/ APRN Student Note and is charted for educational purposes. The non-physician staff attested note is not to be used for billing purposes or to guide patient care. Please see the physician modifications/ attestation for treatment plan/suggestions. This note has been reviewed and feedback has been provided to the student. *  Pt evaluation and documentation discussed with the student. Please see separate attending note for my independent evaluation and management recommendations. Electronically signed by Zollie Leyden, DO on 11/19/2020 at 4:01 PM  Pt Name: Justin Griffith Record Number: [id number]  Date of Birth 1960   Today's Date: 11/19/2020  ASSESSMENT   1. Hospital day # 1   2. Small bowel obstruction possibly due to adhesions from prior abdominal surgeries  3. Abdominal pain, nausea, vomiting   4. Past medical history of abdominal surgeries, possible hernia   PLAN   1. IV fluids   2. NG tube in place   3. NPO w/ ice chips as tolerated  4. Analgesics and antiemetics   5. Awaiting KUB this morning   6. Possible gastrografin if obstruction is not resolved  7. Continue to monitor labs and vitals  SUBJECTIVE   Yoel Getting has slightly improved than yesterday. Pain is well controlled. She has nausea and vomiting. She has not passed flatus and has not had a bowel movement. She is tolerating a Diet NPO Effective Now Exceptions are: Ice Chips, Sips with Meds diet. She is tolerating ambulating in halls. She states that she is cramping, which has resolved slightly since admission. She denies abdominal distension.    CURRENT MEDICATIONS   Scheduled Meds:   sodium chloride flush  10 mL Intravenous 2 times per day    famotidine (PEPCID) injection  20 mg Intravenous BID    insulin lispro  0-6 Units Subcutaneous TID WC    insulin lispro  0-3 Units Subcutaneous Nightly     Continuous Infusions:   sodium chloride 125 mL/hr at 11/19/20 2908    dextrose       PRN Meds:.sodium chloride flush, ondansetron **OR** ondansetron, morphine **OR** morphine, glucose, dextrose, dextrose  OBJECTIVE   VITALS:  height is 4' 11\" (1.499 m) and weight is 125 lb (56.7 kg). Her oral temperature is 98.4 °F (36.9 °C). Her blood pressure is 131/66 and her pulse is 70. Her respiration is 16 and oxygen saturation is 100%. GENERAL: alert, cooperative, no distress, Comfortable  LUNGS: clear to ausculation, without wheezes, rales or rhonci  HEART: normal rate, regular rhythm, normal S1 and S2, no murmurs, rubs, clicks or gallops, distal pulses intact, no carotid bruits  ABDOMEN: soft, non-tender, non-distended, bowel sounds present in all 4 quadrants and no guarding or peritoneal signs  EXTREMITY: no cyanosis, clubbing or edema  NEURO: oriented x 3, moves all 4 extremities  I/O last 3 completed shifts: In: 2314.3 [I.V.:2314.3]  Out: 2125 [Urine:1700; Emesis/NG output:425]  No intake/output data recorded.   LABS     Recent Labs     11/18/20  0350 11/19/20  0606   WBC 14.7* 10.4   HGB 14.7 12.6   HCT 44.6 39.6    264   * 142   K 4.2 3.7   CL 96* 104   CO2 25 26   BUN 10 9   CREATININE 0.5 0.3*   CALCIUM 9.7 7.8*     Recent Labs     11/18/20  0350   ALKPHOS 54   ALT 22   AST 20   BILITOT 0.6   LABALBU 4.5   LIPASE 42.3     CBC:   Lab Results   Component Value Date    WBC 10.4 11/19/2020    RBC 4.53 11/19/2020    HGB 12.6 11/19/2020    HCT 39.6 11/19/2020    MCV 87.4 11/19/2020    MCH 27.8 11/19/2020    MCHC 31.8 11/19/2020     11/19/2020    MPV 10.2 11/19/2020     RADIOLOGY   CT scan abd/pelvis: See radiology report  Abd flat plate: See radiology report    LADONNA Gamez  Electronically signed 11/19/2020 at 8:14 AM

## 2020-11-19 NOTE — CARE COORDINATION
DISASTER CHARTING    11/19/20, 7:52 AM EST    DISCHARGE ONGOING EVALUATION:     [address] [facility] day: 1  Location: 6E-54/054-A Reason for admit: Small bowel obstruction ([facility].) [K56.609]   Barriers to Discharge: SBO. IVF. NG to LIWS. Pain control. Antiemetics. PCP: No primary care provider on file. Patient Goals/Plan/Treatment Preferences: Plans to return home with family, independently. Denies needs.

## 2020-11-20 ENCOUNTER — APPOINTMENT (OUTPATIENT)
Dept: GENERAL RADIOLOGY | Age: 60
DRG: 390 | End: 2020-11-20
Payer: COMMERCIAL

## 2020-11-20 LAB
ANION GAP SERPL CALCULATED.3IONS-SCNC: 13 MEQ/L (ref 8–16)
BUN BLDV-MCNC: 6 MG/DL (ref 7–22)
CALCIUM SERPL-MCNC: 7.7 MG/DL (ref 8.5–10.5)
CHLORIDE BLD-SCNC: 104 MEQ/L (ref 98–111)
CO2: 23 MEQ/L (ref 23–33)
CREAT SERPL-MCNC: 0.3 MG/DL (ref 0.4–1.2)
ERYTHROCYTE [DISTWIDTH] IN BLOOD BY AUTOMATED COUNT: 14.7 % (ref 11.5–14.5)
ERYTHROCYTE [DISTWIDTH] IN BLOOD BY AUTOMATED COUNT: 46.8 FL (ref 35–45)
GFR SERPL CREATININE-BSD FRML MDRD: > 90 ML/MIN/1.73M2
GLUCOSE BLD-MCNC: 106 MG/DL (ref 70–108)
GLUCOSE BLD-MCNC: 107 MG/DL (ref 70–108)
GLUCOSE BLD-MCNC: 112 MG/DL (ref 70–108)
GLUCOSE BLD-MCNC: 127 MG/DL (ref 70–108)
GLUCOSE BLD-MCNC: 96 MG/DL (ref 70–108)
HCT VFR BLD CALC: 38 % (ref 37–47)
HEMOGLOBIN: 12.6 GM/DL (ref 12–16)
MCH RBC QN AUTO: 28.7 PG (ref 26–33)
MCHC RBC AUTO-ENTMCNC: 33.2 GM/DL (ref 32.2–35.5)
MCV RBC AUTO: 86.6 FL (ref 81–99)
PLATELET # BLD: 253 THOU/MM3 (ref 130–400)
PMV BLD AUTO: 9.8 FL (ref 9.4–12.4)
POTASSIUM SERPL-SCNC: 3.4 MEQ/L (ref 3.5–5.2)
RBC # BLD: 4.39 MILL/MM3 (ref 4.2–5.4)
SODIUM BLD-SCNC: 140 MEQ/L (ref 135–145)
WBC # BLD: 10.8 THOU/MM3 (ref 4.8–10.8)

## 2020-11-20 PROCEDURE — 74018 RADEX ABDOMEN 1 VIEW: CPT

## 2020-11-20 PROCEDURE — 6360000002 HC RX W HCPCS: Performed by: SURGERY

## 2020-11-20 PROCEDURE — 85027 COMPLETE CBC AUTOMATED: CPT

## 2020-11-20 PROCEDURE — 6360000002 HC RX W HCPCS: Performed by: PHYSICIAN ASSISTANT

## 2020-11-20 PROCEDURE — 36415 COLL VENOUS BLD VENIPUNCTURE: CPT

## 2020-11-20 PROCEDURE — 99232 SBSQ HOSP IP/OBS MODERATE 35: CPT | Performed by: SURGERY

## 2020-11-20 PROCEDURE — 2580000003 HC RX 258: Performed by: PHYSICIAN ASSISTANT

## 2020-11-20 PROCEDURE — 82948 REAGENT STRIP/BLOOD GLUCOSE: CPT

## 2020-11-20 PROCEDURE — 71045 X-RAY EXAM CHEST 1 VIEW: CPT

## 2020-11-20 PROCEDURE — 80048 BASIC METABOLIC PNL TOTAL CA: CPT

## 2020-11-20 PROCEDURE — 2500000003 HC RX 250 WO HCPCS: Performed by: PHYSICIAN ASSISTANT

## 2020-11-20 PROCEDURE — 1200000000 HC SEMI PRIVATE

## 2020-11-20 RX ORDER — POTASSIUM CHLORIDE 20 MEQ/1
40 TABLET, EXTENDED RELEASE ORAL ONCE
Status: DISCONTINUED | OUTPATIENT
Start: 2020-11-20 | End: 2020-11-20

## 2020-11-20 RX ORDER — POTASSIUM CHLORIDE 7.45 MG/ML
10 INJECTION INTRAVENOUS
Status: COMPLETED | OUTPATIENT
Start: 2020-11-20 | End: 2020-11-20

## 2020-11-20 RX ADMIN — ONDANSETRON 4 MG: 2 INJECTION INTRAMUSCULAR; INTRAVENOUS at 06:54

## 2020-11-20 RX ADMIN — SODIUM CHLORIDE: 9 INJECTION, SOLUTION INTRAVENOUS at 18:50

## 2020-11-20 RX ADMIN — POTASSIUM CHLORIDE 10 MEQ: 7.46 INJECTION, SOLUTION INTRAVENOUS at 14:58

## 2020-11-20 RX ADMIN — POTASSIUM CHLORIDE 10 MEQ: 7.46 INJECTION, SOLUTION INTRAVENOUS at 12:57

## 2020-11-20 RX ADMIN — FAMOTIDINE 20 MG: 10 INJECTION INTRAVENOUS at 20:02

## 2020-11-20 RX ADMIN — Medication 10 ML: at 09:11

## 2020-11-20 RX ADMIN — ENOXAPARIN SODIUM 40 MG: 40 INJECTION SUBCUTANEOUS at 09:09

## 2020-11-20 RX ADMIN — MORPHINE SULFATE 2 MG: 2 INJECTION, SOLUTION INTRAMUSCULAR; INTRAVENOUS at 06:54

## 2020-11-20 RX ADMIN — SODIUM CHLORIDE: 9 INJECTION, SOLUTION INTRAVENOUS at 07:38

## 2020-11-20 RX ADMIN — POTASSIUM CHLORIDE 10 MEQ: 7.46 INJECTION, SOLUTION INTRAVENOUS at 11:11

## 2020-11-20 RX ADMIN — FAMOTIDINE 20 MG: 10 INJECTION INTRAVENOUS at 09:09

## 2020-11-20 RX ADMIN — Medication 10 ML: at 20:03

## 2020-11-20 RX ADMIN — POTASSIUM CHLORIDE 10 MEQ: 7.46 INJECTION, SOLUTION INTRAVENOUS at 17:10

## 2020-11-20 ASSESSMENT — PAIN SCALES - GENERAL
PAINLEVEL_OUTOF10: 0
PAINLEVEL_OUTOF10: 6
PAINLEVEL_OUTOF10: 3

## 2020-11-20 ASSESSMENT — PAIN DESCRIPTION - DESCRIPTORS: DESCRIPTORS: CRAMPING

## 2020-11-20 ASSESSMENT — PAIN DESCRIPTION - PAIN TYPE: TYPE: ACUTE PAIN

## 2020-11-20 ASSESSMENT — PAIN DESCRIPTION - FREQUENCY: FREQUENCY: CONTINUOUS

## 2020-11-20 ASSESSMENT — PAIN DESCRIPTION - LOCATION: LOCATION: ABDOMEN

## 2020-11-20 NOTE — CARE COORDINATION
DISASTER CHARTING    11/20/20, 1:45 PM EST    DISCHARGE ONGOING EVALUATION:     [address] [facility] day: 2  Location: 6E-54/054-A [facility] for [facility]: [facility] [K56.609]   Barriers to Discharge: Clamping NG, trial clear liquids. PRN pain control and antiemetic. PCP: No primary care provider on file. Patient Goals/Plan/Treatment Preferences: Plans home with  independently. Does not anticipate discharge needs.

## 2020-11-20 NOTE — PROGRESS NOTES
DO [address] [facility] GENER[facility]   General Surgery Daily Progress Note    Pt Name: Stephanie Peter  Medical Record Number: [id number]  Date of Birth 1960   Today's Date: 2020  Chief complaint: feeling better  ASSESSMENT   1. Hospital day # 2   2. SBO either due to adhesions or anastomotic stricture  3. hypokalemia   has a past medical history of Type II or unspecified type diabetes mellitus without mention of complication, not stated as uncontrolled. PLAN   1. IV hydration  2. Analgesics and antiemetics as needed  3. Clamp NG  4. Trial clear liquids  5. Lovenox for DVT prophylaxis  6. Increase activity  Omayra Ng is doing slightly better. She denies any nausea or vomiting, has passed flatus or had a bowel movement. She is tolerating a DIET CLEAR LIQUID;. Her pain is well controlled on current medications. She has been ambulating in the room. CURRENT MEDICATIONS   Scheduled Meds:   potassium (CARDIAC) replacement protocol   Other RX Placeholder    enoxaparin  40 mg Subcutaneous Daily    sodium chloride flush  10 mL Intravenous 2 times per day    famotidine (PEPCID) injection  20 mg Intravenous BID    insulin lispro  0-6 Units Subcutaneous TID WC    insulin lispro  0-3 Units Subcutaneous Nightly     Continuous Infusions:   sodium chloride 125 mL/hr at 20 0738    dextrose       PRN Meds:.sodium chloride flush, ondansetron **OR** ondansetron, morphine **OR** morphine, glucose, dextrose, dextrose  OBJECTIVE   CURRENT VITALS:  height is 4' 11\" (1.499 m) and weight is 125 lb (56.7 kg). Her oral temperature is 98.1 °F (36.7 °C). Her blood pressure is 148/69 (abnormal) and her pulse is 70. Her respiration is 16 and oxygen saturation is 98%.    Temperature Range (24h):Temp: 98.1 °F (36.7 °C) Temp  Av.6 °F (37 °C)  Min: 98.1 °F (36.7 °C)  Max: 99 °F (37.2 °C)  BP Range (09R): Systolic (49HJQ), EBJ:976 , Min:122 , IXP:715     Diastolic (56QZH), XMJ:38, Min:56, Max:69    Pulse Range (24h): Pulse  Av.4  Min: 68  Max: 77  Respiration Range (24h): Resp  Av  Min: 16  Max: 16  Current Pulse Ox (24h):  SpO2: 98 %  Pulse Ox Range (24h):  SpO2  Av.2 %  Min: 97 %  Max: 99 %  Oxygen Amount and Delivery:    Incentive Spirometry Tx:            GENERAL: alert, no distress  LUNGS: clear to ausculation, without wheezes, rales or rhonci  HEART: normal rate and regular rhythm  ABDOMEN: soft, non-tender, non-distended, bowel sounds present but decreased in all 4 quadrants and no guarding or peritoneal signs  EXTREMITY: no cyanosis, clubbing or edema  In: 1508.6 [I.V.:1508.6]  Out: 1250 [Urine:1200]  Date 20 0000 - 20   Shift 6643-0810 3115-0956 1082-6473 24 Hour Total   INTAKE   I.V.(mL/kg) 789. 4(13.9)   789. 4(13.9)   Shift Total(mL/kg) 789. 4(13.9)   789. 4(13.9)   OUTPUT   Urine(mL/kg/hr) 600(1.3)   600   Emesis/NG output(mL/kg) 50(0.9)   50(0.9)   Shift Total(mL/kg) 650(11.5)   650(11.5)   Weight (kg) 56.7 56.7 56.7 56.7     LABS     Recent Labs     20  0350 20  0606 20  0625   WBC 14.7* 10.4 10.8   HGB 14.7 12.6 12.6   HCT 44.6 39.6 38.0    264 253   * 142 140   K 4.2 3.7 3.4*   CL 96* 104 104   CO2 25 26 23   BUN 10 9 6*   CREATININE 0.5 0.3* 0.3*   CALCIUM 9.7 7.8* 7.7*      Recent Labs     20  0350   INR 0.97     Recent Labs     20  0350   AST 20   ALT 22   BILITOT 0.6   LIPASE 42.3     Recent Labs     20  0350   TROPONINT < 0.010     RADIOLOGY     XR CHEST PORTABLE   Final Result   1. The distal tip of the esophageal tube extends into the stomach at the junction of the body and fundus. **This report has been created using voice recognition software. It may contain minor errors which are inherent in voice recognition technology. **      Final report electronically signed by Dr. Chato Marin on 2020 7:39 AM      XR ABDOMEN (KUB) (SINGLE AP VIEW)   Final Result   Impression:   1.  Nonspecific bowel gas pattern. This could represent an ileus. No    definite evidence of bowel obstruction. Follow-up as clinically indicated. This document has been electronically signed by: David Moncada MD on 11/20/2020 06:39 AM         XR ABDOMEN (KUB) (SINGLE AP VIEW)   Final Result   Residual partial small bowel obstruction with mottled intraluminal    contents in the right lateral abdomen as previously seen within the    comparison CT 11/18/20. This document has been electronically signed by: Bryan Morales on    11/19/2020 08:27 AM         XR ABDOMEN FOR NG/OG/NE TUBE PLACEMENT   Final Result   Acceptable nasogastric tube placement. This document has been electronically signed by: Bryan Morales on    11/18/2020 07:31 AM         CT ABDOMEN PELVIS W IV CONTRAST Additional Contrast? None   Final Result   Moderate small bowel obstruction with transition point near a surgical    anastomosis in the right lower quadrant, possibly due to adhesions. Mild diffuse colonic wall thickening, which could be due to colitis. Nonemergent/incidental findings in the report. This document has been electronically signed by: Johana Franklin MD on    11/18/2020 05:06 AM      All CT scans at this facility use dose modulation, iterative    reconstruction, and/or weight-based   dosing when appropriate to reduce radiation dose to as low as reasonably    achievable.                Electronically signed by Elvie Ordoñez DO on 11/20/2020 at 9:06 AM

## 2020-11-20 NOTE — PROGRESS NOTES
[address] [place] [address]   Daily Progress Note     **This is a Medical/ PA/ APRN Student Note and is charted for educational purposes. The non-physician staff attested note is not to be used for billing purposes or to guide patient care. Please see the physician modifications/ attestation for treatment plan/suggestions. This note has been reviewed and feedback has been provided to the student. *  Pt evaluation and documentation discussed with the student. Please see separate attending note for my independent evaluation and management recommendations. Electronically signed by Marianna Vickers DO on 11/20/2020 at 8:16 AM  Pt Name: Joss Tejada  Medical Record Number: [id number]  Date of Birth 1960   Today's Date: 11/20/2020  ASSESSMENT   1. Hospital day # 2   2. Small bowel obstruction possibly due to adhesions from prior abdominal surgeries  3. Abdominal pain, nausea, vomiting   4. Past medical history of abdominal surgeries, possible hernia   PLAN   1. IV fluids   2. NG tube in place   3. NPO w/ ice chips as tolerated  4. Analgesics and antiemetics   5. KUB this morning showed abundant stool in the right colon with gas  6. Possible gastrografin if obstruction is not resolved  7. Continue to monitor labs and vitals  SUBJECTIVE   Aretta Cake has slightly improved than yesterday. Pain is well controlled. She has nausea, but denies vomiting. She has passed flatus and has not had a bowel movement. She is tolerating a Diet NPO Effective Now Exceptions are: Ice Chips, Sips with Meds diet. She is tolerating ambulating in halls.    CURRENT MEDICATIONS   Scheduled Meds:   potassium (CARDIAC) replacement protocol   Other RX Placeholder    enoxaparin  40 mg Subcutaneous Daily    sodium chloride flush  10 mL Intravenous 2 times per day    famotidine (PEPCID) injection  20 mg Intravenous BID    insulin lispro  0-6 Units Subcutaneous TID     insulin lispro  0-3 Units Subcutaneous Nightly     Continuous Infusions:  

## 2020-11-21 LAB
ANION GAP SERPL CALCULATED.3IONS-SCNC: 15 MEQ/L (ref 8–16)
BUN BLDV-MCNC: 8 MG/DL (ref 7–22)
CALCIUM SERPL-MCNC: 7.5 MG/DL (ref 8.5–10.5)
CHLORIDE BLD-SCNC: 102 MEQ/L (ref 98–111)
CO2: 21 MEQ/L (ref 23–33)
CREAT SERPL-MCNC: 0.3 MG/DL (ref 0.4–1.2)
GFR SERPL CREATININE-BSD FRML MDRD: > 90 ML/MIN/1.73M2
GLUCOSE BLD-MCNC: 103 MG/DL (ref 70–108)
GLUCOSE BLD-MCNC: 107 MG/DL (ref 70–108)
GLUCOSE BLD-MCNC: 119 MG/DL (ref 70–108)
GLUCOSE BLD-MCNC: 125 MG/DL (ref 70–108)
GLUCOSE BLD-MCNC: 98 MG/DL (ref 70–108)
POTASSIUM SERPL-SCNC: 3.8 MEQ/L (ref 3.5–5.2)
SODIUM BLD-SCNC: 138 MEQ/L (ref 135–145)

## 2020-11-21 PROCEDURE — 6370000000 HC RX 637 (ALT 250 FOR IP): Performed by: SURGERY

## 2020-11-21 PROCEDURE — 82948 REAGENT STRIP/BLOOD GLUCOSE: CPT

## 2020-11-21 PROCEDURE — 99232 SBSQ HOSP IP/OBS MODERATE 35: CPT | Performed by: SURGERY

## 2020-11-21 PROCEDURE — 2500000003 HC RX 250 WO HCPCS: Performed by: PHYSICIAN ASSISTANT

## 2020-11-21 PROCEDURE — 1200000000 HC SEMI PRIVATE

## 2020-11-21 PROCEDURE — 36415 COLL VENOUS BLD VENIPUNCTURE: CPT

## 2020-11-21 PROCEDURE — 6360000002 HC RX W HCPCS: Performed by: PHYSICIAN ASSISTANT

## 2020-11-21 PROCEDURE — 6360000002 HC RX W HCPCS: Performed by: SURGERY

## 2020-11-21 PROCEDURE — 80048 BASIC METABOLIC PNL TOTAL CA: CPT

## 2020-11-21 PROCEDURE — 2580000003 HC RX 258: Performed by: PHYSICIAN ASSISTANT

## 2020-11-21 RX ORDER — IBUPROFEN 200 MG
CAPSULE ORAL 2 TIMES DAILY
Status: DISCONTINUED | OUTPATIENT
Start: 2020-11-21 | End: 2020-11-22 | Stop reason: HOSPADM

## 2020-11-21 RX ADMIN — MORPHINE SULFATE 2 MG: 2 INJECTION, SOLUTION INTRAMUSCULAR; INTRAVENOUS at 12:07

## 2020-11-21 RX ADMIN — SODIUM CHLORIDE: 9 INJECTION, SOLUTION INTRAVENOUS at 02:54

## 2020-11-21 RX ADMIN — MORPHINE SULFATE 2 MG: 2 INJECTION, SOLUTION INTRAMUSCULAR; INTRAVENOUS at 03:07

## 2020-11-21 RX ADMIN — FAMOTIDINE 20 MG: 10 INJECTION INTRAVENOUS at 19:25

## 2020-11-21 RX ADMIN — MORPHINE SULFATE 2 MG: 2 INJECTION, SOLUTION INTRAMUSCULAR; INTRAVENOUS at 14:41

## 2020-11-21 RX ADMIN — SODIUM CHLORIDE: 9 INJECTION, SOLUTION INTRAVENOUS at 18:36

## 2020-11-21 RX ADMIN — Medication 10 ML: at 09:20

## 2020-11-21 RX ADMIN — ONDANSETRON 4 MG: 2 INJECTION INTRAMUSCULAR; INTRAVENOUS at 12:06

## 2020-11-21 RX ADMIN — BACITRACIN ZINC NEOMYCIN SULFATE POLYMYXIN B SULFATE: 400; 3.5; 5 OINTMENT TOPICAL at 20:44

## 2020-11-21 RX ADMIN — FAMOTIDINE 20 MG: 10 INJECTION INTRAVENOUS at 09:20

## 2020-11-21 RX ADMIN — ONDANSETRON 4 MG: 2 INJECTION INTRAMUSCULAR; INTRAVENOUS at 03:07

## 2020-11-21 RX ADMIN — SODIUM CHLORIDE: 9 INJECTION, SOLUTION INTRAVENOUS at 11:09

## 2020-11-21 RX ADMIN — Medication 10 ML: at 19:25

## 2020-11-21 RX ADMIN — ENOXAPARIN SODIUM 40 MG: 40 INJECTION SUBCUTANEOUS at 09:21

## 2020-11-21 ASSESSMENT — PAIN DESCRIPTION - LOCATION
LOCATION: ABDOMEN
LOCATION: ABDOMEN

## 2020-11-21 ASSESSMENT — PAIN SCALES - GENERAL
PAINLEVEL_OUTOF10: 6
PAINLEVEL_OUTOF10: 0

## 2020-11-21 ASSESSMENT — PAIN DESCRIPTION - PAIN TYPE
TYPE: ACUTE PAIN
TYPE: ACUTE PAIN

## 2020-11-21 ASSESSMENT — PAIN DESCRIPTION - DESCRIPTORS
DESCRIPTORS: CRAMPING
DESCRIPTORS: CRAMPING

## 2020-11-21 ASSESSMENT — PAIN DESCRIPTION - ORIENTATION
ORIENTATION: MID;LOWER
ORIENTATION: RIGHT

## 2020-11-21 ASSESSMENT — PAIN DESCRIPTION - FREQUENCY: FREQUENCY: INTERMITTENT

## 2020-11-21 NOTE — PROGRESS NOTES
Zollie Leyden, DO [facility]   General Surgery Daily Progress Note    Pt Name: Miguel Bartlett  Medical Record Number: [id number]  Date of Birth 1960   Today's Date: 2020  Chief complaint: feeling better  ASSESSMENT   1. Hospital day # 3   2. SBO either due to adhesions or anastomotic stricture  3. hypokalemia   has a past medical history of Type II or unspecified type diabetes mellitus without mention of complication, not stated as uncontrolled. PLAN   1. IV hydration  2. Analgesics and antiemetics as needed  3. Remove NG  4. full liquids  5. Lovenox for DVT prophylaxis  6. Increase activity  Shimon Harp is doing better. She denies any nausea or vomiting, has passed flatus or had a bowel movement. She is tolerating a DIET FULL LIQUID;. Her pain is well controlled on current medications. She has been ambulating in the room. CURRENT MEDICATIONS   Scheduled Meds:   potassium (CARDIAC) replacement protocol   Other RX Placeholder    enoxaparin  40 mg Subcutaneous Daily    sodium chloride flush  10 mL Intravenous 2 times per day    famotidine (PEPCID) injection  20 mg Intravenous BID    insulin lispro  0-6 Units Subcutaneous TID WC    insulin lispro  0-3 Units Subcutaneous Nightly     Continuous Infusions:   sodium chloride 125 mL/hr at 20 1109    dextrose       PRN Meds:.sodium chloride flush, ondansetron **OR** ondansetron, morphine **OR** morphine, glucose, dextrose, dextrose  OBJECTIVE   CURRENT VITALS:  height is 4' 11\" (1.499 m) and weight is 125 lb (56.7 kg). Her oral temperature is 98.6 °F (37 °C). Her blood pressure is 136/70 and her pulse is 68. Her respiration is 18 and oxygen saturation is 99%.    Temperature Range (24h):Temp: 98.6 °F (37 °C) Temp  Av.7 °F (37.1 °C)  Min: 97.9 °F (36.6 °C)  Max: 99.3 °F (37.4 °C)  BP Range (37L): Systolic (09BFT), LLY:613 , Min:135 , SIB:292     Diastolic (10ZJP), ITR:83, Min:66, Max:75    Pulse Range (24h): Pulse  Avg: 72.2  Min: 67  Max: 78  Respiration Range (24h): Resp  Av.7  Min: 16  Max: 18  Current Pulse Ox (24h):  SpO2: 99 %  Pulse Ox Range (24h):  SpO2  Av %  Min: 33 %  Max: 100 %  Oxygen Amount and Delivery:    Incentive Spirometry Tx:            GENERAL: alert, no distress  LUNGS: clear to ausculation, without wheezes, rales or rhonci  HEART: normal rate and regular rhythm  ABDOMEN: soft, non-tender, non-distended, bowel sounds present but decreased in all 4 quadrants and no guarding or peritoneal signs  EXTREMITY: no cyanosis, clubbing or edema  In: 2309.3 [P.O.:540; I.V.:1669.3]  Out: 3125 [Urine:3125]  Date 20 0000 - 20 2359   Shift 2485-2107 2960-6952 6916-5594 24 Hour Total   INTAKE   P.O.(mL/kg/hr) 0(0) 240  240   I. V.(mL/kg) 945. 2(16.7)   945. 2(16.7)   Shift Total(mL/kg) 945. 2(16.7) 240(4.2)  1185. 2(20.9)   OUTPUT   Urine(mL/kg/hr) 1625(3.6)   1625   Shift Total(mL/kg) 2893(46.9)   6708(38.2)   Weight (kg) 56.7 56.7 56.7 56.7     LABS     Recent Labs     20  0606 20  0625 20  0910   WBC 10.4 10.8  --    HGB 12.6 12.6  --    HCT 39.6 38.0  --     253  --     140 138   K 3.7 3.4* 3.8    104 102   CO2 26 23 21*   BUN 9 6* 8   CREATININE 0.3* 0.3* 0.3*   CALCIUM 7.8* 7.7* 7.5*      No results for input(s): PTT, INR in the last 72 hours. Invalid input(s): PT  No results for input(s): AST, ALT, BILITOT, BILIDIR, AMYLASE, LIPASE, LDH, LACTA in the last 72 hours. No results for input(s): TROPONINT in the last 72 hours. RADIOLOGY     XR CHEST PORTABLE   Final Result   1. The distal tip of the esophageal tube extends into the stomach at the junction of the body and fundus. **This report has been created using voice recognition software. It may contain minor errors which are inherent in voice recognition technology. **      Final report electronically signed by Dr. Elkin Patterson on 2020 7:39 AM      XR ABDOMEN (KUB) (SINGLE AP VIEW)   Final Result

## 2020-11-22 VITALS
DIASTOLIC BLOOD PRESSURE: 70 MMHG | RESPIRATION RATE: 16 BRPM | HEIGHT: 59 IN | OXYGEN SATURATION: 99 % | BODY MASS INDEX: 25.2 KG/M2 | WEIGHT: 125 LBS | TEMPERATURE: 98.5 F | SYSTOLIC BLOOD PRESSURE: 145 MMHG | HEART RATE: 57 BPM

## 2020-11-22 LAB
ANION GAP SERPL CALCULATED.3IONS-SCNC: 11 MEQ/L (ref 8–16)
BUN BLDV-MCNC: 7 MG/DL (ref 7–22)
CALCIUM SERPL-MCNC: 7.6 MG/DL (ref 8.5–10.5)
CHLORIDE BLD-SCNC: 102 MEQ/L (ref 98–111)
CO2: 24 MEQ/L (ref 23–33)
CREAT SERPL-MCNC: 0.4 MG/DL (ref 0.4–1.2)
GFR SERPL CREATININE-BSD FRML MDRD: > 90 ML/MIN/1.73M2
GLUCOSE BLD-MCNC: 111 MG/DL (ref 70–108)
GLUCOSE BLD-MCNC: 88 MG/DL (ref 70–108)
GLUCOSE BLD-MCNC: 96 MG/DL (ref 70–108)
POTASSIUM SERPL-SCNC: 3.4 MEQ/L (ref 3.5–5.2)
SODIUM BLD-SCNC: 137 MEQ/L (ref 135–145)

## 2020-11-22 PROCEDURE — 99232 SBSQ HOSP IP/OBS MODERATE 35: CPT | Performed by: SURGERY

## 2020-11-22 PROCEDURE — 6360000002 HC RX W HCPCS: Performed by: SURGERY

## 2020-11-22 PROCEDURE — 80048 BASIC METABOLIC PNL TOTAL CA: CPT

## 2020-11-22 PROCEDURE — 2580000003 HC RX 258: Performed by: PHYSICIAN ASSISTANT

## 2020-11-22 PROCEDURE — 36415 COLL VENOUS BLD VENIPUNCTURE: CPT

## 2020-11-22 PROCEDURE — 2500000003 HC RX 250 WO HCPCS: Performed by: PHYSICIAN ASSISTANT

## 2020-11-22 PROCEDURE — 82948 REAGENT STRIP/BLOOD GLUCOSE: CPT

## 2020-11-22 RX ADMIN — ENOXAPARIN SODIUM 40 MG: 40 INJECTION SUBCUTANEOUS at 08:43

## 2020-11-22 RX ADMIN — SODIUM CHLORIDE: 9 INJECTION, SOLUTION INTRAVENOUS at 02:40

## 2020-11-22 RX ADMIN — FAMOTIDINE 20 MG: 10 INJECTION INTRAVENOUS at 08:42

## 2020-11-22 RX ADMIN — Medication 10 ML: at 08:42

## 2020-11-22 ASSESSMENT — PAIN SCALES - GENERAL: PAINLEVEL_OUTOF10: 0

## 2020-11-22 NOTE — PROGRESS NOTES
Zollie Leyden, DO [facility]   General Surgery Daily Progress Note    Pt Name: Miguel Bartlett  Medical Record Number: [id number]  Date of Birth 1960   Today's Date: 2020  Chief complaint: feeling better  ASSESSMENT   1. Hospital day # 4   2. SBO either due to adhesions or anastomotic stricture  3. Hypokalemia - corrected   has a past medical history of Type II or unspecified type diabetes mellitus without mention of complication, not stated as uncontrolled. PLAN   1. Low residue diet as tolerated  2. If tolerated, then discharge home this afternoon. 3. F/U in office in 2 weeks. Shimon Harp is doing better. She denies any nausea or vomiting, has passed flatus and had a bowel movement. She is tolerating a DIET LOW FIBER;. Her pain is well controlled on current medications. She has been ambulating in the room. CURRENT MEDICATIONS   Scheduled Meds:   neomycin-bacitracin-polymyxin   Topical BID    potassium (CARDIAC) replacement protocol   Other RX Placeholder    enoxaparin  40 mg Subcutaneous Daily    sodium chloride flush  10 mL Intravenous 2 times per day    famotidine (PEPCID) injection  20 mg Intravenous BID    insulin lispro  0-6 Units Subcutaneous TID WC    insulin lispro  0-3 Units Subcutaneous Nightly     Continuous Infusions:   dextrose       PRN Meds:.sodium chloride flush, ondansetron **OR** ondansetron, morphine **OR** morphine, glucose, dextrose, dextrose  OBJECTIVE   CURRENT VITALS:  height is 4' 11\" (1.499 m) and weight is 125 lb (56.7 kg). Her oral temperature is 98.3 °F (36.8 °C). Her blood pressure is 138/66 and her pulse is 60. Her respiration is 16 and oxygen saturation is 97%.    Temperature Range (24h):Temp: 98.3 °F (36.8 °C) Temp  Av.6 °F (37 °C)  Min: 98.2 °F (36.8 °C)  Max: 99.5 °F (37.5 °C)  BP Range (27Y): Systolic (16NZR), GRJ:118 , Min:117 , YOM:379     Diastolic (11IPH), SIMONA:16, Min:64, Max:68    Pulse Range (24h): Pulse  Av  Min: 60  Max: 68  Respiration Range (24h): Resp  Av.8  Min: 16  Max: 20  Current Pulse Ox (24h):  SpO2: 97 %  Pulse Ox Range (24h):  SpO2  Av.5 %  Min: 97 %  Max: 100 %  Oxygen Amount and Delivery:    Incentive Spirometry Tx:            GENERAL: alert, no distress  LUNGS: clear to ausculation, without wheezes, rales or rhonci  HEART: normal rate and regular rhythm  ABDOMEN: soft, non-tender, non-distended, bowel sounds present but decreased in all 4 quadrants and no guarding or peritoneal signs  EXTREMITY: no cyanosis, clubbing or edema  In: 2229.5 [P.O.:560; I.V.:1669.5]  Out: 2200 [Urine:2200]  Date 20 - 20   Shift 0820-4728 8952-9558 9903-6615 24 Hour Total   INTAKE   P.O.(mL/kg/hr) 120(0.3)   120   I. V.(mL/kg) 910.6(16.1)   910.6(16.1)   Shift Total(mL/kg) 1030. 6(18.2)   1030. 6(18.2)   OUTPUT   Urine(mL/kg/hr) 900(2)   900   Shift Total(mL/kg) 900(15.9)   900(15.9)   Weight (kg) 56.7 56.7 56.7 56.7     LABS     Recent Labs     20  0625 20  0910   WBC 10.8  --    HGB 12.6  --    HCT 38.0  --      --     138   K 3.4* 3.8    102   CO2 23 21*   BUN 6* 8   CREATININE 0.3* 0.3*   CALCIUM 7.7* 7.5*      No results for input(s): PTT, INR in the last 72 hours. Invalid input(s): PT  No results for input(s): AST, ALT, BILITOT, BILIDIR, AMYLASE, LIPASE, LDH, LACTA in the last 72 hours. No results for input(s): TROPONINT in the last 72 hours. RADIOLOGY     XR CHEST PORTABLE   Final Result   1. The distal tip of the esophageal tube extends into the stomach at the junction of the body and fundus. **This report has been created using voice recognition software. It may contain minor errors which are inherent in voice recognition technology. **      Final report electronically signed by Dr. Shilpa Gonsales on 2020 7:39 AM      XR ABDOMEN (KUB) (SINGLE AP VIEW)   Final Result   Impression:   1. Nonspecific bowel gas pattern. This could represent an ileus.  No definite evidence of bowel obstruction. Follow-up as clinically indicated. This document has been electronically signed by: Leonard Horan MD on 11/20/2020 06:39 AM         XR ABDOMEN (KUB) (SINGLE AP VIEW)   Final Result   Residual partial small bowel obstruction with mottled intraluminal    contents in the right lateral abdomen as previously seen within the    comparison CT 11/18/20. This document has been electronically signed by: Juan M Youssef. Tahir Sil on    11/19/2020 08:27 AM         XR ABDOMEN FOR NG/OG/NE TUBE PLACEMENT   Final Result   Acceptable nasogastric tube placement. This document has been electronically signed by: Juan M Youssef. Tahir Sil on    11/18/2020 07:31 AM         CT ABDOMEN PELVIS W IV CONTRAST Additional Contrast? None   Final Result   Moderate small bowel obstruction with transition point near a surgical    anastomosis in the right lower quadrant, possibly due to adhesions. Mild diffuse colonic wall thickening, which could be due to colitis. Nonemergent/incidental findings in the report. This document has been electronically signed by: Yoon Topete MD on    11/18/2020 05:06 AM      All CT scans at this facility use dose modulation, iterative    reconstruction, and/or weight-based   dosing when appropriate to reduce radiation dose to as low as reasonably    achievable.                Electronically signed by Sonja Rodarte DO on 11/22/2020 at 9:42 AM

## 2020-11-25 NOTE — ADT AUTH CERT
Clamp NG    * Milestone    Additional Notes    11/20        Vs- 98.2 (36.8)   20   71   148/69  98% RA       Labs    11/20/2020 06:25    Sodium: 140    Potassium: 3.4 (L)    Chloride: 104    CO2: 23    BUN: 6 (L)    Creatinine: 0.3 (L)    Anion Gap: 13.0    Est, Glom Filt Rate: >90    Glucose: 127 (H)    Calcium: 7.7 (L)    WBC: 10.8    RBC: 4.39    Hemoglobin Quant: 12.6    Hematocrit: 38.0    MCV: 86.6    MCH: 28.7    MCHC: 33.2    MPV: 9.8    RDW-CV: 14.7 (H)    RDW-SD: 46.8 (H)    Platelet Count: 767       11/20/2020 06:31    XR ABDOMEN    Impression:    1. Nonspecific bowel gas pattern. This could represent an ileus. No    definite evidence of bowel obstruction. Follow-up as clinically indicated. 11/20/2020 07:35    XR CHEST    1. The distal tip of the esophageal tube extends into the stomach at the junction of the body and fundus. Per GS    ASSESSMENT    1. Hospital day # 2    2. SBO either due to adhesions or anastomotic stricture    3. hypokalemia    4. has a past medical history of Type II or unspecified type diabetes mellitus without mention of complication, not stated as uncontrolled. PLAN    1. IV hydration    2. Analgesics and antiemetics as needed    3. Clamp NG    4. Trial clear liquids    5. Lovenox for DVT prophylaxis    6. Increase activity       Whitehouse Cheryl is doing slightly better. She denies any nausea or vomiting, has passed flatus or had a bowel movement. She is tolerating a DIET CLEAR LIQUID;. Her pain is well controlled on current medications. She has been ambulating in the room.           Scheduled Medications    · enoxaparin 40 mg Subcutaneous Daily    · famotidine (PEPCID) injection 20 mg Intravenous BID    · insulin lispro 0-6 Units Subcutaneous TID WC    · insulin lispro 0-3 Units Subcutaneous Nightly       Infusions Meds    · sodium chloride 125 mL/hr          PRN Meds    morphine (PF) injection 2 mg  x1    ondansetron (ZOFRAN) injection 4 mg x1

## 2020-12-10 NOTE — PROGRESS NOTES
Nely Atkinson. [facility], [address] [place] [address] 360  [place] [address]  [phone]  Hospital Follow-up Evaluation in Office    Pt Name: Estelle Egan  Date of Birth 1960   Today's Date: 2020  Medical Record Number: [id number]  Referring Provider: No ref. provider found  Primary Care Provider: No primary care provider on file. Chief Complaint   Patient presents with    Follow-Up from [facility] on 2020-Small bowel obstruction     ASSESSMENT       Diagnosis Orders   1. Small bowel obstruction ([address]           PLANS       Patient Instructions   Low residue diet as tolerated, may include salads and grains. Continue to avoid large bulky raw vegetables, large seeds or nuts. No orders of the defined types were placed in this encounter. Follow up: Return for As needed. Instructed to call if any concerns. Brianne Casas is a 64 y.o. female seen in the office as a hospital follow up. Pt was admitted to the hospital for a small bowel obstruction. This was managed medically. Since discharge, Her symptoms have improved. She is tolerating a regular diet, having regular bowel movements. She denies any abdominal pain, change in bowel habits, constipation, nausea and vomiting. Pain is controlled without any medications.   Past Medical History  Past Medical History:   Diagnosis Date    Anemia     Small bowel obstruction (HCC)     Type II or unspecified type diabetes mellitus without mention of complication, not stated as uncontrolled     controlled     Past Surgical History  Past Surgical History:   Procedure Laterality Date     SECTION      x2    HYSTERECTOMY  2005    Dr Jeniffer Alvarado    OTHER SURGICAL HISTORY  2005    exp lap placment colostomy- [facility]    OTHER SURGICAL HISTORY  2005    Takedown of colostomy small bowel resection Dr Omar Plaza  2006    Dr Karin Gordon Medications  Current Outpatient Medications   Medication Sig Dispense Refill    insulin glargine (LANTUS) 100 UNIT/ML injection vial Inject into the skin nightly 15-30 units morning      VITAMIN D PO Take by mouth daily      ferrous sulfate (IRON 325) 325 (65 Fe) MG tablet Take 325 mg by mouth daily (with breakfast)      vitamin B-12 (CYANOCOBALAMIN) 100 MCG tablet Take 50 mcg by mouth daily      metFORMIN (GLUCOPHAGE) 1000 MG tablet TAKE 1 TABLET BY MOUTH TWO TIMES A DAY  60 tablet 2    sitaGLIPtin (JANUVIA) 100 MG tablet Take 1 tablet by mouth daily 90 tablet 3    estradiol (ESTRACE) 1 MG tablet Take 1 mg by mouth daily.  Calcium-Vitamin D (CALCIUM + D PO) Take 1 tablet by mouth daily.  Multiple Vitamin (MULTI-VITAMIN PO) Take 1 tablet by mouth daily. No current facility-administered medications for this visit. Allergies  has No Known Allergies. Family History  family history is not on file. Social History   reports that she has never smoked. She has never used smokeless tobacco. She reports that she does not drink alcohol or use drugs. Health Screening Exams  Health Maintenance   Topic Date Due    Hepatitis C screen  1960    Diabetic foot exam  07/21/1972    HIV screen  07/21/1977    Hepatitis B vaccine (1 of 3 - Risk 3-dose series) 07/21/1981    DTaP/Tdap/Td vaccine (1 - Tdap) 07/21/1981    Cervical cancer screen  07/21/1983    Shingles Vaccine (1 of 2) 07/21/2012    Lipid screen  09/12/2015    Diabetic microalbuminuria test  12/12/2016    A1C test (Diabetic or Prediabetic)  11/05/2017    Diabetic retinal exam  01/17/2018    Flu vaccine (1) 09/01/2020    Breast cancer screen  07/10/2022    Colon cancer screen colonoscopy  04/26/2023    Hepatitis A vaccine  Aged Out    Hib vaccine  Aged Out    Meningococcal (ACWY) vaccine  Aged Out    Pneumococcal 0-64 years Vaccine  Aged Out     Review of Systems  History obtained from the patient.   Constitutional: Denies any fever, chills. Derm: Denies any rash or skin color change. Eyes: Denies blurred or decreased in vision. Ent: Denies any tinnitus or vertigo. Resp: Denies any cough or shortness of breath. CV: Denies any syncope, palpitations or chest pain. GI:  Denies any abdominal pain, nausea, vomiting, constipation or diarrhea. : Denies any hematuria, hesitancy, or dysuria. Heme/Lymph: Denies any bleeding. Musculoskeletal: Denies any myalgias, muscle weakness or neck pain. Neuro: Denies any dizziness, paresthesia or weakness. OBJECTIVE    VITALS:  height is 4' 11\" (1.499 m) and weight is 123 lb 11.2 oz (56.1 kg). Her temporal temperature is 97 °F (36.1 °C). Her blood pressure is 120/60 and her pulse is 83. Her respiration is 18 and oxygen saturation is 98%. Pain Score:   0 - No pain  CONSTITUTIONAL: Alert and oriented times 3, no acute distress and cooperative to examination with proper mood and affect. SKIN: Skin color, texture, turgor normal. No rashes or lesions. LYMPH: no cervical nodes, no inguinal nodes  HEENT: Head is normocephalic, atraumatic. EOMI, PERRLA. NECK: Supple, symmetrical, trachea midline, no adenopathy, thyroid symmetric, not enlarged and no tenderness, skin normal.  CHEST/LUNGS: chest symmetric with normal A/P diameter, normal respiratory rate and rhythm, lungs clear to auscultation without wheezes, rales or rhonchi. No accessory muscle use. Scars None   CARDIOVASCULAR: Heart sounds are normal.  Regular rate and rhythm without murmur, gallop or rub. Normal S1 and S2. Carotid and femoral pulses 2+/4 and equal bilaterally. ABDOMEN: Normal shape. surgical scar(s) present. Normal bowel sounds. No bruits. soft, nontender, nondistended, no masses or organomegaly. no evidence of hernia. Percussion: Normal without hepatosplenomegally. RECTAL: deferred, not clinically indicated  NEUROLOGIC: There are no focalizing motor or sensory deficits. CN II-XII are grossly intact. Anabell Ware EXTREMITIES: no cyanosis, no clubbing and no edema. Thank you for the interesting evaluation. Further recommendations as listed above.        Electronically signed by Gracie King DO on 12/11/2020 at 12:59 PM

## 2020-12-11 ENCOUNTER — OFFICE VISIT (OUTPATIENT)
Dept: SURGERY | Age: 60
End: 2020-12-11
Payer: COMMERCIAL

## 2020-12-11 VITALS
SYSTOLIC BLOOD PRESSURE: 120 MMHG | WEIGHT: 123.7 LBS | HEART RATE: 83 BPM | OXYGEN SATURATION: 98 % | RESPIRATION RATE: 18 BRPM | TEMPERATURE: 97 F | BODY MASS INDEX: 24.94 KG/M2 | DIASTOLIC BLOOD PRESSURE: 60 MMHG | HEIGHT: 59 IN

## 2020-12-11 PROCEDURE — 99212 OFFICE O/P EST SF 10 MIN: CPT | Performed by: SURGERY

## 2020-12-11 RX ORDER — FERROUS SULFATE 325(65) MG
325 TABLET ORAL
COMMUNITY

## 2020-12-11 RX ORDER — UBIDECARENONE 75 MG
50 CAPSULE ORAL DAILY
COMMUNITY

## 2020-12-11 RX ORDER — INSULIN GLARGINE 100 [IU]/ML
INJECTION, SOLUTION SUBCUTANEOUS NIGHTLY
COMMUNITY

## 2020-12-11 NOTE — PATIENT INSTRUCTIONS
Low residue diet as tolerated, may include salads and grains. Continue to avoid large bulky raw vegetables, large seeds or nuts.

## 2020-12-24 NOTE — DISCHARGE SUMMARY
[address] SUMMARY  Pt Name: Galen Graham  MRN: [id number]  YOB: 1960  Primary Care Physician: No primary care provider on file. Admit date:  11/18/2020  3:21 AM  Discharge date:  11/22/2020  3:53 PM  Disposition: home  Admitting Diagnosis:   1. SBO (small bowel obstruction) ([facility])      Discharge Diagnosis:   Patient Active Problem List   Diagnosis Code    DM II (diabetes mellitus, type II), controlled ([facility] E11.9    Small bowel obstruction ([facility] U05.689     Consultants:  none  Procedures/Diagnostic Test:  Medical management  Hospital Course: Debi Warren originally presented to the hospital on 11/18/2020  3:21 AM with a small bowel obstruction. She was admitted, placed on NG decompression, IV fluids, analgesics and antiemetics and followed clinically with labs, films and exams. 11/19/20 - she was doing slightly better, no flatus or BM, pain had improved. 11/20/20 - had passed flatus, NG clamped and trial of clear liquids. Potassium replaced. 11/21/20 - Ng removed, advanced to full liquids. At time of discharge 11/22/20, Debi Warren was tolerating a low residue diet, having bowel movements,ambulating on her own accord and had adequate analgesia on oral pain medications, and had no signs of symptoms of complications. PHYSICAL EXAMINATION   Discharge Vitals:  height is 4' 11\" (1.499 m) and weight is 125 lb (56.7 kg). Her oral temperature is 98.5 °F (36.9 °C). Her blood pressure is 145/70 (abnormal) and her pulse is 57. Her respiration is 16 and oxygen saturation is 99%.    General appearance - alert, well appearing, and in no distress  Chest - clear to ausculation  Heart - normal rate and regular rhythm  Abdomen - soft, bowel sounds present  Neurological - motor and sensory grossly normal bilaterally  Musculoskeletal - full range of motion without pain  Extremities - peripheral pulses normal, no pedal edema, no clubbing or cyanosis  LABS   No results for input(s): WBC, HGB, HCT, PLT, NA, K, CL, CO2, BUN, CREATININE in the last 720 hours. DISCHARGE INSTRUCTIONS   Discharge Medications:      Medication List      CONTINUE taking these medications    CALCIUM + D PO     estradiol 1 MG tablet  Commonly known as: ESTRACE     metFORMIN 1000 MG tablet  Commonly known as: GLUCOPHAGE  TAKE 1 TABLET BY MOUTH TWO TIMES A DAY     MULTI-VITAMIN PO     SITagliptin 100 MG tablet  Commonly known as: JANUVIA  Take 1 tablet by mouth daily        STOP taking these medications    glimepiride 2 MG tablet  Commonly known as: Amaryl          Diet: low residue diet as tolerated  Activity: activity as tolerated  Follow-up:  in the next few weeks with No primary care provider on file., Follow up with Gracie King in 2 weeks.   Time Spent for discharge: 20 minutes      Electronically signed by Gracie King DO on 12/24/2020 at 11:07 AM

## 2021-07-16 ENCOUNTER — HOSPITAL ENCOUNTER (OUTPATIENT)
Dept: WOMENS IMAGING | Age: 61
Discharge: HOME | End: 2021-07-16
Payer: COMMERCIAL

## 2021-07-16 DIAGNOSIS — Z12.31 VISIT FOR SCREENING MAMMOGRAM: ICD-10-CM

## 2021-07-16 PROCEDURE — 77063 BREAST TOMOSYNTHESIS BI: CPT

## 2022-07-22 ENCOUNTER — HOSPITAL ENCOUNTER (OUTPATIENT)
Dept: WOMENS IMAGING | Age: 62
Discharge: HOME | End: 2022-07-22
Payer: COMMERCIAL

## 2022-07-22 DIAGNOSIS — Z12.31 VISIT FOR SCREENING MAMMOGRAM: ICD-10-CM

## 2022-07-22 PROCEDURE — 77063 BREAST TOMOSYNTHESIS BI: CPT

## 2023-07-28 ENCOUNTER — HOSPITAL ENCOUNTER (OUTPATIENT)
Dept: WOMENS IMAGING | Age: 63
Discharge: HOME | End: 2023-07-28
Payer: COMMERCIAL

## 2023-07-28 VITALS — HEIGHT: 58 IN | BODY MASS INDEX: 26.45 KG/M2 | WEIGHT: 126 LBS

## 2023-07-28 DIAGNOSIS — Z12.31 VISIT FOR SCREENING MAMMOGRAM: ICD-10-CM

## 2023-07-28 PROCEDURE — 77067 SCR MAMMO BI INCL CAD: CPT

## 2024-04-19 LAB
ANION GAP SERPL CALCULATED.3IONS-SCNC: 9 MEQ/L (ref 7–16)
BUN BLDV-MCNC: 19 MG/DL (ref 8–23)
CALCIUM SERPL-MCNC: 9 MG/DL (ref 8.5–10.5)
CHLORIDE BLD-SCNC: 104 MEQ/L (ref 95–107)
CO2: 27 MEQ/L (ref 19–31)
CREAT SERPL-MCNC: 0.61 MG/DL (ref 0.6–1.3)
CREATINE, URINE: 111.6 MG/DL
EGFR IF NONAFRICAN AMERICAN: 102 ML/MIN/1.73
ESTIMATED AVERAGE GLUCOSE: 146 MG/DL
GLUCOSE: 150 MG/DL (ref 70–99)
HBA1C MFR BLD: 6.7 % (ref 4.2–5.6)
MICROALBUMIN/CREAT 24H UR: <1.2 MG/DL
MICROALBUMIN/CREAT UR-RTO: NORMAL MG/G
POTASSIUM SERPL-SCNC: 5.2 MEQ/L (ref 3.5–5.4)
SODIUM BLD-SCNC: 140 MEQ/L (ref 133–146)

## 2024-08-02 ENCOUNTER — HOSPITAL ENCOUNTER (OUTPATIENT)
Dept: WOMENS IMAGING | Age: 64
Discharge: HOME | End: 2024-08-02
Payer: COMMERCIAL

## 2024-08-02 VITALS — BODY MASS INDEX: 26.47 KG/M2 | WEIGHT: 126.1 LBS | HEIGHT: 58 IN

## 2024-08-02 DIAGNOSIS — Z12.31 VISIT FOR SCREENING MAMMOGRAM: ICD-10-CM

## 2024-08-02 PROCEDURE — 77063 BREAST TOMOSYNTHESIS BI: CPT

## 2024-08-16 ENCOUNTER — HOSPITAL ENCOUNTER (OUTPATIENT)
Dept: WOMENS IMAGING | Age: 64
Discharge: HOME | End: 2024-08-16
Payer: COMMERCIAL

## 2024-08-16 DIAGNOSIS — R92.2 INCONCLUSIVE MAMMOGRAM: ICD-10-CM

## 2024-08-16 PROCEDURE — 76641 ULTRASOUND BREAST COMPLETE: CPT

## 2025-04-14 ENCOUNTER — TRANSCRIBE ORDERS (OUTPATIENT)
Dept: ADMINISTRATIVE | Age: 65
End: 2025-04-14

## 2025-04-14 DIAGNOSIS — R92.30 DENSE BREAST TISSUE ON MAMMOGRAM, UNSPECIFIED TYPE: Primary | ICD-10-CM

## 2025-07-01 ENCOUNTER — TRANSCRIBE ORDERS (OUTPATIENT)
Dept: ADMINISTRATIVE | Age: 65
End: 2025-07-01

## 2025-07-01 DIAGNOSIS — Z12.31 ENCOUNTER FOR SCREENING MAMMOGRAM FOR MALIGNANT NEOPLASM OF BREAST: Primary | ICD-10-CM

## 2025-08-08 ENCOUNTER — HOSPITAL ENCOUNTER (OUTPATIENT)
Dept: WOMENS IMAGING | Age: 65
Discharge: HOME | End: 2025-08-08
Payer: COMMERCIAL

## 2025-08-08 ENCOUNTER — HOSPITAL ENCOUNTER (OUTPATIENT)
Dept: WOMENS IMAGING | Age: 65
Discharge: HOME | End: 2025-08-08
Attending: STUDENT IN AN ORGANIZED HEALTH CARE EDUCATION/TRAINING PROGRAM
Payer: COMMERCIAL

## 2025-08-08 VITALS — HEIGHT: 59 IN | WEIGHT: 126 LBS | BODY MASS INDEX: 25.4 KG/M2

## 2025-08-08 DIAGNOSIS — Z12.31 ENCOUNTER FOR SCREENING MAMMOGRAM FOR MALIGNANT NEOPLASM OF BREAST: ICD-10-CM

## 2025-08-08 DIAGNOSIS — R92.30 DENSE BREAST TISSUE ON MAMMOGRAM, UNSPECIFIED TYPE: ICD-10-CM

## 2025-08-08 PROCEDURE — 77063 BREAST TOMOSYNTHESIS BI: CPT

## 2025-08-08 PROCEDURE — 76641 ULTRASOUND BREAST COMPLETE: CPT
